# Patient Record
Sex: MALE | Race: WHITE | Employment: FULL TIME | URBAN - METROPOLITAN AREA
[De-identification: names, ages, dates, MRNs, and addresses within clinical notes are randomized per-mention and may not be internally consistent; named-entity substitution may affect disease eponyms.]

---

## 2017-03-04 ENCOUNTER — PATIENT MESSAGE (OUTPATIENT)
Dept: FAMILY MEDICINE CLINIC | Facility: CLINIC | Age: 52
End: 2017-03-04

## 2017-03-06 NOTE — TELEPHONE ENCOUNTER
From: Harper France  To: Vaishnavi Eldridge MD  Sent: 3/4/2017 12:58 AM CST  Subject: Prescription Question    Hi Dr. Herman Ojeda know that I have been sufferring from leg swelling now for years now, and I have been trying to think what it coincided with,

## 2017-05-12 ENCOUNTER — OFFICE VISIT (OUTPATIENT)
Dept: FAMILY MEDICINE CLINIC | Facility: CLINIC | Age: 52
End: 2017-05-12

## 2017-05-12 VITALS
HEART RATE: 76 BPM | WEIGHT: 292 LBS | BODY MASS INDEX: 40.43 KG/M2 | HEIGHT: 71.4 IN | DIASTOLIC BLOOD PRESSURE: 82 MMHG | RESPIRATION RATE: 18 BRPM | SYSTOLIC BLOOD PRESSURE: 122 MMHG | TEMPERATURE: 98 F

## 2017-05-12 DIAGNOSIS — E78.5 HYPERLIPIDEMIA, UNSPECIFIED HYPERLIPIDEMIA TYPE: Primary | ICD-10-CM

## 2017-05-12 DIAGNOSIS — I87.2 VENOUS INSUFFICIENCY OF BOTH LOWER EXTREMITIES: ICD-10-CM

## 2017-05-12 DIAGNOSIS — M54.32 SCIATIC NERVE PAIN, LEFT: ICD-10-CM

## 2017-05-12 DIAGNOSIS — E66.9 NON MORBID OBESITY, UNSPECIFIED OBESITY TYPE: ICD-10-CM

## 2017-05-12 PROCEDURE — 99212 OFFICE O/P EST SF 10 MIN: CPT | Performed by: FAMILY MEDICINE

## 2017-05-12 RX ORDER — GLUCOSA SU 2KCL/CHONDROITIN SU 500-400 MG
CAPSULE ORAL
Qty: 100 CAPSULE | Refills: 0 | Status: SHIPPED | OUTPATIENT
Start: 2017-05-12 | End: 2017-06-11

## 2017-05-12 RX ORDER — SIMVASTATIN 40 MG
TABLET ORAL
Qty: 90 TABLET | Refills: 1 | Status: SHIPPED | OUTPATIENT
Start: 2017-05-12 | End: 2017-12-18

## 2017-05-12 NOTE — PROGRESS NOTES
HPI:    Patient ID: Jahaira Meehan is a 45year old male.     HPI    Review of Systems         Current Outpatient Prescriptions:  simvastatin 40 MG Oral Tab TAKE ONE TABLET BY MOUTH NIGHTLY Disp: 90 tablet Rfl: 1   Coenzyme Q10 (CO Q10) 100 MG Oral Cap One strengthening exercises. No orders of the defined types were placed in this encounter.        Meds This Visit:  Signed Prescriptions Disp Refills    simvastatin 40 MG Oral Tab 90 tablet 1      Sig: TAKE ONE TABLET BY MOUTH NIGHTLY      Coenzyme Q10 (CO Q

## 2017-07-14 ENCOUNTER — PATIENT MESSAGE (OUTPATIENT)
Dept: FAMILY MEDICINE CLINIC | Facility: CLINIC | Age: 52
End: 2017-07-14

## 2017-07-17 NOTE — TELEPHONE ENCOUNTER
From: Winnie Dumont  To: Alexa Mroeira MD  Sent: 7/14/2017 10:25 AM CDT  Subject: Other    Dr Rajiv Parsons    My company is giving me a weekly stipend to pay for medical insurance. Can you recommend a good plan that is affordable and that your office accepts?

## 2017-07-17 NOTE — TELEPHONE ENCOUNTER
Jorje Cover can you please help patient find out which plans we take, and any additional information you can provide.

## 2017-08-01 NOTE — TELEPHONE ENCOUNTER
Please see previous my chart encounter and be sure Jose L Sterling is aware of patient's repeated inquiry.

## 2017-08-09 NOTE — TELEPHONE ENCOUNTER
Rima---please see My Chart Message, would you recommend that I provide patient with our Financial Counselor's contact information?

## 2017-11-13 ENCOUNTER — APPOINTMENT (OUTPATIENT)
Dept: LAB | Age: 52
End: 2017-11-13
Attending: FAMILY MEDICINE
Payer: COMMERCIAL

## 2017-11-13 ENCOUNTER — OFFICE VISIT (OUTPATIENT)
Dept: FAMILY MEDICINE CLINIC | Facility: CLINIC | Age: 52
End: 2017-11-13

## 2017-11-13 VITALS
HEART RATE: 76 BPM | SYSTOLIC BLOOD PRESSURE: 130 MMHG | DIASTOLIC BLOOD PRESSURE: 84 MMHG | RESPIRATION RATE: 17 BRPM | HEIGHT: 71.4 IN | BODY MASS INDEX: 42 KG/M2 | TEMPERATURE: 98 F | WEIGHT: 303.38 LBS

## 2017-11-13 DIAGNOSIS — R07.9 CHEST PAIN, UNSPECIFIED TYPE: ICD-10-CM

## 2017-11-13 DIAGNOSIS — E78.5 HYPERLIPIDEMIA, UNSPECIFIED HYPERLIPIDEMIA TYPE: ICD-10-CM

## 2017-11-13 DIAGNOSIS — R06.83 SNORING: ICD-10-CM

## 2017-11-13 DIAGNOSIS — I87.2 VENOUS INSUFFICIENCY OF BOTH LOWER EXTREMITIES: ICD-10-CM

## 2017-11-13 DIAGNOSIS — E66.01 MORBID OBESITY (HCC): ICD-10-CM

## 2017-11-13 DIAGNOSIS — Z72.89 OTHER PROBLEMS RELATED TO LIFESTYLE: ICD-10-CM

## 2017-11-13 DIAGNOSIS — E78.5 HYPERLIPIDEMIA, UNSPECIFIED HYPERLIPIDEMIA TYPE: Primary | ICD-10-CM

## 2017-11-13 PROCEDURE — 99214 OFFICE O/P EST MOD 30 MIN: CPT | Performed by: FAMILY MEDICINE

## 2017-11-13 PROCEDURE — 99212 OFFICE O/P EST SF 10 MIN: CPT | Performed by: FAMILY MEDICINE

## 2017-11-13 PROCEDURE — 36415 COLL VENOUS BLD VENIPUNCTURE: CPT

## 2017-11-13 PROCEDURE — 84443 ASSAY THYROID STIM HORMONE: CPT

## 2017-11-13 PROCEDURE — 80053 COMPREHEN METABOLIC PANEL: CPT

## 2017-11-13 PROCEDURE — 86803 HEPATITIS C AB TEST: CPT

## 2017-11-13 PROCEDURE — 80061 LIPID PANEL: CPT

## 2017-11-13 PROCEDURE — 90686 IIV4 VACC NO PRSV 0.5 ML IM: CPT | Performed by: FAMILY MEDICINE

## 2017-11-13 PROCEDURE — 90471 IMMUNIZATION ADMIN: CPT | Performed by: FAMILY MEDICINE

## 2017-11-13 NOTE — PROGRESS NOTES
HPI:    Patient ID: Pavithra Strong is a 45year old male. Hyperlipidemia   This is a chronic problem. The current episode started more than 1 year ago. The problem is controlled. Exacerbating diseases include obesity. He has no history of diabetes.  Ther obesity, occasional daytime somnolence. Recommend sleep study    Venous insufficiency of both lower extremities– encourage continued low-sodium diet, compression hose, avoid standing and sitting for long periods.   Referred to vascular surgery    Morbid ob

## 2017-11-20 ENCOUNTER — HOSPITAL ENCOUNTER (OUTPATIENT)
Dept: NUCLEAR MEDICINE | Facility: HOSPITAL | Age: 52
Discharge: HOME | End: 2017-11-20
Attending: FAMILY MEDICINE
Payer: COMMERCIAL

## 2017-11-20 ENCOUNTER — HOSPITAL ENCOUNTER (OUTPATIENT)
Dept: CV DIAGNOSTICS | Facility: HOSPITAL | Age: 52
Discharge: HOME | End: 2017-11-20
Attending: FAMILY MEDICINE
Payer: COMMERCIAL

## 2017-11-20 DIAGNOSIS — R07.9 CHEST PAIN, UNSPECIFIED TYPE: ICD-10-CM

## 2017-11-20 PROCEDURE — 78452 HT MUSCLE IMAGE SPECT MULT: CPT | Performed by: FAMILY MEDICINE

## 2017-11-20 PROCEDURE — 93017 CV STRESS TEST TRACING ONLY: CPT | Performed by: FAMILY MEDICINE

## 2017-11-20 PROCEDURE — 93016 CV STRESS TEST SUPVJ ONLY: CPT | Performed by: FAMILY MEDICINE

## 2017-11-20 PROCEDURE — 93018 CV STRESS TEST I&R ONLY: CPT | Performed by: FAMILY MEDICINE

## 2017-11-20 RX ORDER — SODIUM CHLORIDE 9 MG/ML
INJECTION, SOLUTION INTRAVENOUS
Status: COMPLETED
Start: 2017-11-20 | End: 2017-11-20

## 2017-11-20 RX ADMIN — SODIUM CHLORIDE 50 ML: 9 INJECTION, SOLUTION INTRAVENOUS at 13:30:00

## 2017-11-28 ENCOUNTER — PATIENT MESSAGE (OUTPATIENT)
Dept: FAMILY MEDICINE CLINIC | Facility: CLINIC | Age: 52
End: 2017-11-28

## 2017-11-28 DIAGNOSIS — E66.01 MORBID OBESITY (HCC): Primary | ICD-10-CM

## 2017-11-28 NOTE — TELEPHONE ENCOUNTER
From: Lesli Mcgregor  To: Tran Pulido MD  Sent: 11/28/2017 9:19 AM CST  Subject: Test Results Question    So looks like my heart is good?

## 2017-12-01 ENCOUNTER — TELEPHONE (OUTPATIENT)
Dept: SURGERY | Facility: CLINIC | Age: 52
End: 2017-12-01

## 2017-12-01 NOTE — TELEPHONE ENCOUNTER
Spoke to patient regarding incoming referral for bariatric surgery consult. Pt states he will be switching insurance plans to [facility]  starting . ID in epic is also .  Pt plans to send his updated ID and insurance information to jose

## 2017-12-21 PROBLEM — R60.0 BILATERAL LEG EDEMA: Status: ACTIVE | Noted: 2017-12-21

## 2017-12-22 RX ORDER — SIMVASTATIN 40 MG
TABLET ORAL
Qty: 90 TABLET | Refills: 0 | Status: SHIPPED | OUTPATIENT
Start: 2017-12-22 | End: 2018-05-22

## 2017-12-22 NOTE — TELEPHONE ENCOUNTER
Signed Prescriptions Disp Refills    SIMVASTATIN 40 MG Oral Tab 90 tablet 0      Sig: TAKE ONE TABLET BY MOUTH NIGHTLY        Authorizing Provider: Srinivas Bellamy        Ordering User: Jase Jimenez           Refill approved per protocol.           Chol

## 2018-01-11 ENCOUNTER — TELEPHONE (OUTPATIENT)
Dept: SURGERY | Facility: CLINIC | Age: 53
End: 2018-01-11

## 2018-01-22 ENCOUNTER — OFFICE VISIT (OUTPATIENT)
Dept: FAMILY MEDICINE CLINIC | Facility: CLINIC | Age: 53
End: 2018-01-22

## 2018-01-22 VITALS
TEMPERATURE: 98 F | SYSTOLIC BLOOD PRESSURE: 121 MMHG | WEIGHT: 303 LBS | HEIGHT: 72 IN | BODY MASS INDEX: 41.04 KG/M2 | RESPIRATION RATE: 17 BRPM | HEART RATE: 75 BPM | DIASTOLIC BLOOD PRESSURE: 79 MMHG

## 2018-01-22 DIAGNOSIS — I89.0 LYMPHEDEMA OF LOWER EXTREMITY, UNSPECIFIED LATERALITY: ICD-10-CM

## 2018-01-22 DIAGNOSIS — E66.01 MORBID OBESITY (HCC): Primary | ICD-10-CM

## 2018-01-22 DIAGNOSIS — E78.5 HYPERLIPIDEMIA, UNSPECIFIED HYPERLIPIDEMIA TYPE: ICD-10-CM

## 2018-01-22 PROCEDURE — 99212 OFFICE O/P EST SF 10 MIN: CPT | Performed by: FAMILY MEDICINE

## 2018-01-22 PROCEDURE — 99213 OFFICE O/P EST LOW 20 MIN: CPT | Performed by: FAMILY MEDICINE

## 2018-01-22 NOTE — PROGRESS NOTES
HPI:    Patient ID: Bret Handley is a 52year old male.     See below        Review of Systems         Current Outpatient Prescriptions:  SIMVASTATIN 40 MG Oral Tab TAKE ONE TABLET BY MOUTH NIGHTLY Disp: 90 tablet Rfl: 0   omeprazole (PRILOSEC) 20 MG Oral of weight loss. No orders of the defined types were placed in this encounter.       Meds This Visit:  No prescriptions requested or ordered in this encounter    Imaging & Referrals:  Nani Castro, DIET (INTERNAL)       YS#[id number]

## 2018-02-09 ENCOUNTER — TELEPHONE (OUTPATIENT)
Dept: SURGERY | Facility: CLINIC | Age: 53
End: 2018-02-09

## 2018-02-09 NOTE — TELEPHONE ENCOUNTER
Patient was referred to Bariatric Dept to see Dr. Fadia Peralta by Dr. Mellisa Parker. I called insurance on 1/25/18 @ 11:00am and verified with Elvia Sumner. That patients current insurance does not have any coverage for Bariatric surgery.      I called patient today and informed

## 2018-05-22 RX ORDER — SIMVASTATIN 40 MG
40 TABLET ORAL NIGHTLY
Qty: 90 TABLET | Refills: 0 | Status: SHIPPED | OUTPATIENT
Start: 2018-05-22 | End: 2018-08-15

## 2018-05-22 NOTE — TELEPHONE ENCOUNTER
Refilled per protocol      Cholesterol Medications  Protocol Criteria:  · Appointment scheduled in the past 12 months or in the next 3 months  · ALT & LDL on file in the past 12 months  · ALT result < 80  · LDL result <130   Recent Outpatient Visits

## 2018-05-22 NOTE — TELEPHONE ENCOUNTER
From: Jeff Dan  Sent: 5/22/2018 9:54 AM CDT  Subject: Medication Renewal Request    Travon Overton would like a refill of the following medications:     SIMVASTATIN 40 MG Oral Tab [Lima Crawford MD]   Patient Comment: Have not received a renewal o

## 2018-05-23 RX ORDER — SIMVASTATIN 40 MG
TABLET ORAL
Qty: 90 TABLET | Refills: 3 | Status: SHIPPED | OUTPATIENT
Start: 2018-05-23 | End: 2019-06-26

## 2018-08-15 ENCOUNTER — OFFICE VISIT (OUTPATIENT)
Dept: FAMILY MEDICINE CLINIC | Facility: CLINIC | Age: 53
End: 2018-08-15
Payer: COMMERCIAL

## 2018-08-15 VITALS
BODY MASS INDEX: 38.47 KG/M2 | DIASTOLIC BLOOD PRESSURE: 73 MMHG | WEIGHT: 284 LBS | HEART RATE: 80 BPM | SYSTOLIC BLOOD PRESSURE: 107 MMHG | HEIGHT: 72 IN

## 2018-08-15 DIAGNOSIS — M17.0 PRIMARY OSTEOARTHRITIS OF BOTH KNEES: Primary | ICD-10-CM

## 2018-08-15 DIAGNOSIS — E66.9 NON MORBID OBESITY, UNSPECIFIED OBESITY TYPE: ICD-10-CM

## 2018-08-15 PROCEDURE — 99212 OFFICE O/P EST SF 10 MIN: CPT | Performed by: FAMILY MEDICINE

## 2018-08-15 PROCEDURE — 99213 OFFICE O/P EST LOW 20 MIN: CPT | Performed by: FAMILY MEDICINE

## 2018-08-15 RX ORDER — CELECOXIB 200 MG/1
200 CAPSULE ORAL DAILY
Qty: 90 CAPSULE | Refills: 3 | Status: SHIPPED | OUTPATIENT
Start: 2018-08-15 | End: 2019-08-01

## 2018-08-16 NOTE — PROGRESS NOTES
HPI:    Patient ID: Lesli Mcgregor is a 52year old male. Knee Pain    The pain is present in the right knee and left knee. This is a chronic problem. The current episode started more than 1 year ago. The problem has been gradually worsening.  The qualit seen by a physician. Unsure of active ingredients. Initially there was some improvement in knee pain but subsequent worsening. Discussed options for treatment.   Plan continued weight loss, exercise with cycling, and start Celebrex as directed reviewed i

## 2018-09-18 ENCOUNTER — OFFICE VISIT (OUTPATIENT)
Dept: FAMILY MEDICINE CLINIC | Facility: CLINIC | Age: 53
End: 2018-09-18
Payer: COMMERCIAL

## 2018-09-18 VITALS
RESPIRATION RATE: 18 BRPM | DIASTOLIC BLOOD PRESSURE: 82 MMHG | HEIGHT: 72 IN | SYSTOLIC BLOOD PRESSURE: 128 MMHG | WEIGHT: 300.81 LBS | HEART RATE: 73 BPM | TEMPERATURE: 98 F | BODY MASS INDEX: 40.74 KG/M2

## 2018-09-18 DIAGNOSIS — L91.8 MULTIPLE ACQUIRED SKIN TAGS: Primary | ICD-10-CM

## 2018-09-18 PROCEDURE — 11200 RMVL SKIN TAGS UP TO&INC 15: CPT | Performed by: FAMILY MEDICINE

## 2018-09-19 NOTE — PROGRESS NOTES
HPI:    Patient ID: Genie Gosselin is a 52year old male. Skin   This is a chronic problem. The current episode started more than 1 year ago. The problem has been gradually worsening.        Review of Systems         Current Outpatient Medications:  johnny

## 2019-02-05 ENCOUNTER — OFFICE VISIT (OUTPATIENT)
Dept: FAMILY MEDICINE CLINIC | Facility: CLINIC | Age: 54
End: 2019-02-05

## 2019-02-05 VITALS
WEIGHT: 309 LBS | HEART RATE: 76 BPM | BODY MASS INDEX: 42 KG/M2 | SYSTOLIC BLOOD PRESSURE: 132 MMHG | RESPIRATION RATE: 18 BRPM | TEMPERATURE: 98 F | DIASTOLIC BLOOD PRESSURE: 78 MMHG

## 2019-02-05 DIAGNOSIS — I87.2 VENOUS INSUFFICIENCY OF BOTH LOWER EXTREMITIES: ICD-10-CM

## 2019-02-05 DIAGNOSIS — E66.01 MORBID OBESITY (HCC): ICD-10-CM

## 2019-02-05 DIAGNOSIS — E78.5 HYPERLIPIDEMIA, UNSPECIFIED HYPERLIPIDEMIA TYPE: ICD-10-CM

## 2019-02-05 DIAGNOSIS — M17.0 PRIMARY OSTEOARTHRITIS OF BOTH KNEES: Primary | ICD-10-CM

## 2019-02-05 PROCEDURE — 90471 IMMUNIZATION ADMIN: CPT | Performed by: FAMILY MEDICINE

## 2019-02-05 PROCEDURE — 90686 IIV4 VACC NO PRSV 0.5 ML IM: CPT | Performed by: FAMILY MEDICINE

## 2019-02-05 PROCEDURE — 99212 OFFICE O/P EST SF 10 MIN: CPT | Performed by: FAMILY MEDICINE

## 2019-02-05 NOTE — PROGRESS NOTES
HPI:    Patient ID: Mariusz Hendricks is a 62year old male. Knee Pain    The pain is present in the left knee and right knee. This is a chronic problem. The current episode started more than 1 year ago. There has been no history of extremity trauma.  The p somewhat effective. Strongly recommend weight loss. Morbid obesity– patient has tried Tad Pickerel and multiple other weight loss methods in the past.  No sustained success. Strongly encourage consideration of bariatric surgery. Referrals given.     Hy

## 2019-02-05 NOTE — PATIENT INSTRUCTIONS
If self pay look into  Magen Prim. Gallo Mckeon M.D. [id number] [facility] Gallo Mckeon M.D.  [facility]. [email or web address]   [address], [address]   Office Phone: [phone]

## 2019-02-06 LAB
ALBUMIN/GLOBULIN RATIO: 1.8 (CALC) (ref 1–2.5)
ALBUMIN: 4.3 G/DL (ref 3.6–5.1)
ALKALINE PHOSPHATASE: 81 U/L (ref 40–115)
ALT: 36 U/L (ref 9–46)
AST: 33 U/L (ref 10–35)
BILIRUBIN, TOTAL: 0.7 MG/DL (ref 0.2–1.2)
BUN: 13 MG/DL (ref 7–25)
CALCIUM: 9.6 MG/DL (ref 8.6–10.3)
CARBON DIOXIDE: 29 MMOL/L (ref 20–32)
CHLORIDE: 106 MMOL/L (ref 98–110)
CHOL/HDLC RATIO: 3.1 (CALC)
CHOLESTEROL, TOTAL: 172 MG/DL
CREATININE: 0.83 MG/DL (ref 0.7–1.33)
EGFR IF AFRICN AM: 114 ML/MIN/1.73M2
EGFR IF NONAFRICN AM: 98 ML/MIN/1.73M2
GLOBULIN: 2.4 G/DL (CALC) (ref 1.9–3.7)
GLUCOSE: 100 MG/DL (ref 65–99)
HDL CHOLESTEROL: 55 MG/DL
LDL-CHOLESTEROL: 97 MG/DL (CALC)
NON-HDL CHOLESTEROL: 117 MG/DL (CALC)
POTASSIUM: 4.6 MMOL/L (ref 3.5–5.3)
PROTEIN, TOTAL: 6.7 G/DL (ref 6.1–8.1)
SODIUM: 142 MMOL/L (ref 135–146)
TRIGLYCERIDES: 107 MG/DL

## 2019-02-21 ENCOUNTER — PATIENT MESSAGE (OUTPATIENT)
Dept: FAMILY MEDICINE CLINIC | Facility: CLINIC | Age: 54
End: 2019-02-21

## 2019-02-22 NOTE — TELEPHONE ENCOUNTER
From: Lynne Rizo  To: Ulysses Crowell MD  Sent: 2/21/2019 4:27 PM CST  Subject: Test Results Question    Doc    What about the results from the sleep study I took back in late 2017? Sould they suffice? Those visits were $250 each out of pocket.     Just

## 2019-02-22 NOTE — TELEPHONE ENCOUNTER
JAYCEE Bashir please see pt GBooking message below. I have send pt the number where they can call to find out about the cost of a x ray or labs.

## 2019-05-05 ENCOUNTER — HOSPITAL (OUTPATIENT)
Dept: OTHER | Age: 54
End: 2019-05-05
Attending: EMERGENCY MEDICINE

## 2019-05-06 ENCOUNTER — TELEPHONE (OUTPATIENT)
Dept: FAMILY MEDICINE CLINIC | Facility: CLINIC | Age: 54
End: 2019-05-06

## 2019-05-06 NOTE — TELEPHONE ENCOUNTER
Pt called in requesting to come in for staple removal from his head. Pt stated that he was advised to come in 5 days after staple placement which was over the weekend. Pt is requesting to come in on 5/1. MH is it ok to book pt in a res 24 slot?      Please reply to pool: SCARLET Acuna

## 2019-05-06 NOTE — TELEPHONE ENCOUNTER
I suspect you mean 5/10? Anyway, whenever it is, okay to use acute visit. But please let him know it may be just a wound check, staples usually do not come out in hand until 10 days after wound.

## 2019-05-13 ENCOUNTER — OFFICE VISIT (OUTPATIENT)
Dept: FAMILY MEDICINE CLINIC | Facility: CLINIC | Age: 54
End: 2019-05-13
Payer: COMMERCIAL

## 2019-05-13 VITALS
RESPIRATION RATE: 20 BRPM | TEMPERATURE: 98 F | HEIGHT: 72 IN | HEART RATE: 88 BPM | DIASTOLIC BLOOD PRESSURE: 83 MMHG | WEIGHT: 300 LBS | SYSTOLIC BLOOD PRESSURE: 148 MMHG | BODY MASS INDEX: 40.63 KG/M2

## 2019-05-13 DIAGNOSIS — S80.11XD CONTUSION OF RIGHT KNEE AND LOWER LEG, SUBSEQUENT ENCOUNTER: ICD-10-CM

## 2019-05-13 DIAGNOSIS — Z48.02 VISIT FOR SUTURE REMOVAL: Primary | ICD-10-CM

## 2019-05-13 DIAGNOSIS — S80.01XD CONTUSION OF RIGHT KNEE AND LOWER LEG, SUBSEQUENT ENCOUNTER: ICD-10-CM

## 2019-05-13 DIAGNOSIS — M77.11 LATERAL EPICONDYLITIS OF RIGHT ELBOW: ICD-10-CM

## 2019-05-13 PROCEDURE — 99212 OFFICE O/P EST SF 10 MIN: CPT | Performed by: FAMILY MEDICINE

## 2019-05-13 PROCEDURE — 99213 OFFICE O/P EST LOW 20 MIN: CPT | Performed by: FAMILY MEDICINE

## 2019-05-13 NOTE — PROGRESS NOTES
HPI:    Patient ID: Eriberto Perez is a 62year old male. Suture Removal   The sutures were placed 7 to 10 days ago. He tried antibiotic ointment use since the wound repair. The treatment provided significant relief.  There has been no drainage from the placed in this encounter.       Meds This Visit:  Requested Prescriptions      No prescriptions requested or ordered in this encounter       Imaging & Referrals:  None       #[id number]

## 2019-06-26 RX ORDER — SIMVASTATIN 40 MG
TABLET ORAL
Qty: 90 TABLET | Refills: 2 | Status: SHIPPED | OUTPATIENT
Start: 2019-06-26 | End: 2020-06-03

## 2019-06-26 NOTE — TELEPHONE ENCOUNTER
Refill passed per [facility] protocol.   Cholesterol Medications  Protocol Criteria:  · Appointment scheduled in the past 12 months or in the next 3 months  · ALT & LDL on file in the past 12 months  · ALT result < 80  · LDL result <130   Recent Outpat

## 2019-07-30 ENCOUNTER — PATIENT MESSAGE (OUTPATIENT)
Dept: FAMILY MEDICINE CLINIC | Facility: CLINIC | Age: 54
End: 2019-07-30

## 2019-07-31 NOTE — TELEPHONE ENCOUNTER
From: Humberto Lennon  To: Don Figueredo MD  Sent: 7/30/2019 11:47 AM CDT  Subject: Prescription Question    Hi Dr. Tanika Moore    A friend of mine sent this article to me about the dangers of Statins. Can you take a look and let me know your thoughts?  Are any

## 2019-08-01 RX ORDER — CELECOXIB 200 MG/1
CAPSULE ORAL
Qty: 90 CAPSULE | Refills: 3 | Status: SHIPPED | OUTPATIENT
Start: 2019-08-01 | End: 2021-01-06

## 2019-09-12 ENCOUNTER — OFFICE VISIT (OUTPATIENT)
Dept: FAMILY MEDICINE CLINIC | Facility: CLINIC | Age: 54
End: 2019-09-12
Payer: COMMERCIAL

## 2019-09-12 ENCOUNTER — APPOINTMENT (OUTPATIENT)
Dept: LAB | Facility: HOSPITAL | Age: 54
End: 2019-09-12
Attending: FAMILY MEDICINE
Payer: COMMERCIAL

## 2019-09-12 VITALS
HEART RATE: 80 BPM | WEIGHT: 301.38 LBS | RESPIRATION RATE: 18 BRPM | BODY MASS INDEX: 41 KG/M2 | DIASTOLIC BLOOD PRESSURE: 80 MMHG | TEMPERATURE: 98 F | SYSTOLIC BLOOD PRESSURE: 140 MMHG

## 2019-09-12 DIAGNOSIS — E78.2 MIXED HYPERLIPIDEMIA: ICD-10-CM

## 2019-09-12 DIAGNOSIS — Z12.5 SCREENING FOR PROSTATE CANCER: ICD-10-CM

## 2019-09-12 DIAGNOSIS — R74.8 ELEVATED LIVER ENZYMES: Primary | ICD-10-CM

## 2019-09-12 DIAGNOSIS — E66.01 MORBID OBESITY (HCC): ICD-10-CM

## 2019-09-12 PROBLEM — Z80.42 FAMILY HISTORY OF PROSTATE CANCER: Status: ACTIVE | Noted: 2019-09-12

## 2019-09-12 PROCEDURE — 36415 COLL VENOUS BLD VENIPUNCTURE: CPT

## 2019-09-12 PROCEDURE — 99214 OFFICE O/P EST MOD 30 MIN: CPT | Performed by: FAMILY MEDICINE

## 2019-09-12 NOTE — PROGRESS NOTES
HPI:    Patient ID: Prabhu Early is a 62year old male. Cirrhosis   This is a chronic problem. The current episode started more than 1 year ago. The problem has been unchanged. Associated symptoms include a rash.  Pertinent negatives include no abdomin evaluation. Screening for prostate cancer–brother recently diagnosed with prostate cancer. Recommend annual PSA testing. Morbid obesity (hcc)– diet and weight loss recommendations discussed    Mixed hyperlipidemia–tolerating simvastatin well.   José Matthews

## 2019-09-13 LAB
% SATURATION: 20 % (CALC) (ref 20–48)
IRON BINDING CAPACITY: 312 MCG/DL (CALC) (ref 250–425)
IRON, TOTAL: 63 MCG/DL (ref 50–180)
PSA, TOTAL: 1.8 NG/ML

## 2020-04-16 ENCOUNTER — VIRTUAL PHONE E/M (OUTPATIENT)
Dept: FAMILY MEDICINE CLINIC | Facility: CLINIC | Age: 55
End: 2020-04-16
Payer: COMMERCIAL

## 2020-04-16 DIAGNOSIS — R74.8 ELEVATED LIVER ENZYMES: Primary | ICD-10-CM

## 2020-04-16 DIAGNOSIS — M17.0 PRIMARY OSTEOARTHRITIS OF BOTH KNEES: ICD-10-CM

## 2020-04-16 DIAGNOSIS — E66.01 MORBID OBESITY (HCC): ICD-10-CM

## 2020-04-16 DIAGNOSIS — E78.2 MIXED HYPERLIPIDEMIA: ICD-10-CM

## 2020-04-16 PROCEDURE — 99212 OFFICE O/P EST SF 10 MIN: CPT | Performed by: FAMILY MEDICINE

## 2020-04-16 NOTE — PROGRESS NOTES
Virtual Telephone Check-In    Mirian Joshua verbally consents to a Virtual/Telephone Check-In visit on 04/16/20. Patient understands and accepts financial responsibility for any deductible, co-insurance and/or co-pays associated with this service.     D

## 2020-06-03 RX ORDER — SIMVASTATIN 40 MG
TABLET ORAL
Qty: 90 TABLET | Refills: 3 | Status: SHIPPED | OUTPATIENT
Start: 2020-06-03 | End: 2021-07-28

## 2020-06-17 ENCOUNTER — APPOINTMENT (OUTPATIENT)
Dept: LAB | Age: 55
End: 2020-06-17
Attending: INTERNAL MEDICINE
Payer: COMMERCIAL

## 2020-06-17 DIAGNOSIS — R79.89 ELEVATED LIVER FUNCTION TESTS: ICD-10-CM

## 2020-06-17 PROCEDURE — 86256 FLUORESCENT ANTIBODY TITER: CPT | Performed by: INTERNAL MEDICINE

## 2020-06-17 PROCEDURE — 87340 HEPATITIS B SURFACE AG IA: CPT | Performed by: INTERNAL MEDICINE

## 2020-06-17 PROCEDURE — 82550 ASSAY OF CK (CPK): CPT | Performed by: INTERNAL MEDICINE

## 2020-06-17 PROCEDURE — 86708 HEPATITIS A ANTIBODY: CPT | Performed by: INTERNAL MEDICINE

## 2020-06-17 PROCEDURE — 86706 HEP B SURFACE ANTIBODY: CPT | Performed by: INTERNAL MEDICINE

## 2020-06-17 PROCEDURE — 80076 HEPATIC FUNCTION PANEL: CPT | Performed by: INTERNAL MEDICINE

## 2020-06-17 PROCEDURE — 82390 ASSAY OF CERULOPLASMIN: CPT | Performed by: INTERNAL MEDICINE

## 2020-06-17 PROCEDURE — 85025 COMPLETE CBC W/AUTO DIFF WBC: CPT | Performed by: INTERNAL MEDICINE

## 2020-06-17 PROCEDURE — 86038 ANTINUCLEAR ANTIBODIES: CPT | Performed by: INTERNAL MEDICINE

## 2020-06-17 PROCEDURE — 82104 ALPHA-1-ANTITRYPSIN PHENO: CPT

## 2020-06-17 PROCEDURE — 36415 COLL VENOUS BLD VENIPUNCTURE: CPT | Performed by: INTERNAL MEDICINE

## 2020-06-17 PROCEDURE — 82103 ALPHA-1-ANTITRYPSIN TOTAL: CPT

## 2020-06-25 ENCOUNTER — HOSPITAL ENCOUNTER (OUTPATIENT)
Dept: ULTRASOUND IMAGING | Facility: HOSPITAL | Age: 55
Discharge: HOME | End: 2020-06-25
Attending: INTERNAL MEDICINE
Payer: COMMERCIAL

## 2020-06-25 DIAGNOSIS — K76.0 NAFLD (NONALCOHOLIC FATTY LIVER DISEASE): ICD-10-CM

## 2020-06-25 PROCEDURE — 76705 ECHO EXAM OF ABDOMEN: CPT | Performed by: INTERNAL MEDICINE

## 2020-06-25 PROCEDURE — 76981 USE PARENCHYMA: CPT | Performed by: INTERNAL MEDICINE

## 2020-06-26 ENCOUNTER — OFFICE VISIT (OUTPATIENT)
Dept: FAMILY MEDICINE CLINIC | Facility: CLINIC | Age: 55
End: 2020-06-26
Payer: COMMERCIAL

## 2020-06-26 VITALS
HEART RATE: 93 BPM | WEIGHT: 311 LBS | BODY MASS INDEX: 42 KG/M2 | TEMPERATURE: 98 F | RESPIRATION RATE: 18 BRPM | DIASTOLIC BLOOD PRESSURE: 80 MMHG | SYSTOLIC BLOOD PRESSURE: 134 MMHG

## 2020-06-26 DIAGNOSIS — R74.8 ELEVATED LIVER ENZYMES: Primary | ICD-10-CM

## 2020-06-26 DIAGNOSIS — E66.01 MORBID OBESITY (HCC): ICD-10-CM

## 2020-06-26 DIAGNOSIS — K76.0 HEPATIC STEATOSIS: ICD-10-CM

## 2020-06-26 DIAGNOSIS — M17.0 PRIMARY OSTEOARTHRITIS OF BOTH KNEES: ICD-10-CM

## 2020-06-26 PROCEDURE — 90632 HEPA VACCINE ADULT IM: CPT | Performed by: FAMILY MEDICINE

## 2020-06-26 PROCEDURE — 90471 IMMUNIZATION ADMIN: CPT | Performed by: FAMILY MEDICINE

## 2020-06-26 PROCEDURE — 99213 OFFICE O/P EST LOW 20 MIN: CPT | Performed by: FAMILY MEDICINE

## 2020-06-26 NOTE — PROGRESS NOTES
HPI:    Patient ID: Prabhu Early is a 60year old male. Obesity   This is a chronic problem. The current episode started more than 1 year ago. The problem has been waxing and waning. Associated symptoms include fatigue and joint swelling.  Pertinent ne Dr. Landry Beaulieu.     Morbid obesity (hcc)– patient has had success with weight loss using Ettie Nely in the past.  He does not wish to do this again but encouraged him to consider [place] weight loss program, Irvin or Carito    Hepatic steatosis–as above    Primar

## 2020-08-10 ENCOUNTER — VIRTUAL PHONE E/M (OUTPATIENT)
Dept: SURGERY | Facility: CLINIC | Age: 55
End: 2020-08-10
Payer: COMMERCIAL

## 2020-08-10 DIAGNOSIS — K74.00 LIVER FIBROSIS: Primary | ICD-10-CM

## 2020-08-12 NOTE — PROGRESS NOTES
[facility] at [address], [address] [place] [address]  [address] [phone] ([phone]) symptoms of liver disease.      Past Medical History:   Diagnosis Date   • Back pain    • Bilateral lower extremity edema 2012    perpheral BLE edema   • Esophageal reflux     medication, EGD 2013   • Hemorrhoids 2001    colonoscopy, nl colonoscopy 2013   • 6/25/20 consistent with fatty liver with moderate to severe fibrosis (F3), no lesion.    - LFTs 6/17/20 with increased ALT and AST elevation; continue to monitor  - Discussed the importance of weight loss in preserving liver function; recommend consulting

## 2020-09-12 ENCOUNTER — HOSPITAL ENCOUNTER (OUTPATIENT)
Dept: ULTRASOUND IMAGING | Facility: HOSPITAL | Age: 55
Discharge: HOME | End: 2020-09-12
Attending: INTERNAL MEDICINE
Payer: COMMERCIAL

## 2020-09-12 DIAGNOSIS — K74.00 LIVER FIBROSIS: ICD-10-CM

## 2021-01-06 RX ORDER — CELECOXIB 200 MG/1
CAPSULE ORAL
Qty: 90 CAPSULE | Refills: 3 | Status: SHIPPED | OUTPATIENT
Start: 2021-01-06 | End: 2021-12-27

## 2021-01-13 ENCOUNTER — OFFICE VISIT (OUTPATIENT)
Dept: FAMILY MEDICINE CLINIC | Facility: CLINIC | Age: 56
End: 2021-01-13
Payer: COMMERCIAL

## 2021-01-13 ENCOUNTER — LAB ENCOUNTER (OUTPATIENT)
Dept: LAB | Age: 56
End: 2021-01-13
Attending: FAMILY MEDICINE
Payer: COMMERCIAL

## 2021-01-13 VITALS
SYSTOLIC BLOOD PRESSURE: 111 MMHG | WEIGHT: 313.38 LBS | RESPIRATION RATE: 18 BRPM | TEMPERATURE: 98 F | DIASTOLIC BLOOD PRESSURE: 71 MMHG | HEIGHT: 72 IN | BODY MASS INDEX: 42.45 KG/M2 | HEART RATE: 87 BPM

## 2021-01-13 DIAGNOSIS — K76.0 HEPATIC STEATOSIS: ICD-10-CM

## 2021-01-13 DIAGNOSIS — E66.01 MORBID OBESITY (HCC): ICD-10-CM

## 2021-01-13 DIAGNOSIS — K74.00 LIVER FIBROSIS: ICD-10-CM

## 2021-01-13 DIAGNOSIS — Z12.5 SCREENING FOR MALIGNANT NEOPLASM OF PROSTATE: ICD-10-CM

## 2021-01-13 DIAGNOSIS — R35.1 NOCTURIA: ICD-10-CM

## 2021-01-13 DIAGNOSIS — I87.2 VENOUS INSUFFICIENCY OF BOTH LOWER EXTREMITIES: ICD-10-CM

## 2021-01-13 DIAGNOSIS — M17.0 PRIMARY OSTEOARTHRITIS OF BOTH KNEES: Primary | ICD-10-CM

## 2021-01-13 DIAGNOSIS — E78.2 MIXED HYPERLIPIDEMIA: ICD-10-CM

## 2021-01-13 LAB
AFP-TM SERPL-MCNC: 3.4 NG/ML (ref ?–8)
ALBUMIN SERPL-MCNC: 3.9 G/DL (ref 3.4–5)
ALBUMIN/GLOB SERPL: 1.1 {RATIO} (ref 1–2)
ALP LIVER SERPL-CCNC: 94 U/L
ALT SERPL-CCNC: 96 U/L
ANION GAP SERPL CALC-SCNC: 6 MMOL/L (ref 0–18)
AST SERPL-CCNC: 63 U/L (ref 15–37)
BILIRUB SERPL-MCNC: 0.6 MG/DL (ref 0.1–2)
BUN BLD-MCNC: 17 MG/DL (ref 7–18)
BUN/CREAT SERPL: 18.5 (ref 10–20)
CALCIUM BLD-MCNC: 9.3 MG/DL (ref 8.5–10.1)
CHLORIDE SERPL-SCNC: 105 MMOL/L (ref 98–112)
CHOLEST SMN-MCNC: 175 MG/DL (ref ?–200)
CO2 SERPL-SCNC: 28 MMOL/L (ref 21–32)
COMPLEXED PSA SERPL-MCNC: 1.45 NG/ML (ref ?–4)
CREAT BLD-MCNC: 0.92 MG/DL
DEPRECATED RDW RBC AUTO: 41.2 FL (ref 35.1–46.3)
ERYTHROCYTE [DISTWIDTH] IN BLOOD BY AUTOMATED COUNT: 12.6 % (ref 11–15)
GLOBULIN PLAS-MCNC: 3.6 G/DL (ref 2.8–4.4)
GLUCOSE BLD-MCNC: 102 MG/DL (ref 70–99)
HCT VFR BLD AUTO: 46.7 %
HDLC SERPL-MCNC: 52 MG/DL (ref 40–59)
HGB BLD-MCNC: 15.6 G/DL
INR BLD: 1 (ref 0.9–1.2)
LDLC SERPL CALC-MCNC: 102 MG/DL (ref ?–100)
M PROTEIN MFR SERPL ELPH: 7.5 G/DL (ref 6.4–8.2)
MCH RBC QN AUTO: 30 PG (ref 26–34)
MCHC RBC AUTO-ENTMCNC: 33.4 G/DL (ref 31–37)
MCV RBC AUTO: 89.8 FL
NONHDLC SERPL-MCNC: 123 MG/DL (ref ?–130)
OSMOLALITY SERPL CALC.SUM OF ELEC: 290 MOSM/KG (ref 275–295)
PATIENT FASTING Y/N/NP: YES
PATIENT FASTING Y/N/NP: YES
PLATELET # BLD AUTO: 262 10(3)UL (ref 150–450)
POTASSIUM SERPL-SCNC: 4.3 MMOL/L (ref 3.5–5.1)
PROTHROMBIN TIME: 13 SECONDS (ref 11.8–14.5)
RBC # BLD AUTO: 5.2 X10(6)UL
SODIUM SERPL-SCNC: 139 MMOL/L (ref 136–145)
TRIGL SERPL-MCNC: 104 MG/DL (ref 30–149)
VLDLC SERPL CALC-MCNC: 21 MG/DL (ref 0–30)
WBC # BLD AUTO: 8.9 X10(3) UL (ref 4–11)

## 2021-01-13 PROCEDURE — 82105 ALPHA-FETOPROTEIN SERUM: CPT | Performed by: INTERNAL MEDICINE

## 2021-01-13 PROCEDURE — 3078F DIAST BP <80 MM HG: CPT | Performed by: FAMILY MEDICINE

## 2021-01-13 PROCEDURE — 3074F SYST BP LT 130 MM HG: CPT | Performed by: FAMILY MEDICINE

## 2021-01-13 PROCEDURE — 80061 LIPID PANEL: CPT | Performed by: INTERNAL MEDICINE

## 2021-01-13 PROCEDURE — 99214 OFFICE O/P EST MOD 30 MIN: CPT | Performed by: FAMILY MEDICINE

## 2021-01-13 PROCEDURE — 3008F BODY MASS INDEX DOCD: CPT | Performed by: FAMILY MEDICINE

## 2021-01-13 PROCEDURE — 85027 COMPLETE CBC AUTOMATED: CPT

## 2021-01-13 PROCEDURE — 85610 PROTHROMBIN TIME: CPT | Performed by: INTERNAL MEDICINE

## 2021-01-13 PROCEDURE — 80053 COMPREHEN METABOLIC PANEL: CPT | Performed by: INTERNAL MEDICINE

## 2021-01-13 PROCEDURE — 36415 COLL VENOUS BLD VENIPUNCTURE: CPT | Performed by: INTERNAL MEDICINE

## 2021-01-13 RX ORDER — TAMSULOSIN HYDROCHLORIDE 0.4 MG/1
0.4 CAPSULE ORAL DAILY
Qty: 90 CAPSULE | Refills: 0 | Status: SHIPPED | OUTPATIENT
Start: 2021-01-13 | End: 2021-02-12

## 2021-01-13 NOTE — PROGRESS NOTES
HPI:    Patient ID: Ju Bradford is a 60year old male. Knee Pain   This is a chronic problem. The current episode started more than 1 year ago. There has been no history of extremity trauma. The problem occurs daily.  The problem has been waxing and w effective. Recommend orthopedics evaluation, weight loss. Venous insufficiency of both lower extremities–continue support hose, weight loss    Hepatic steatosis–followed by Dr. Symone Rothman. Has upcoming follow-up appointment. Check labs today, fasting.

## 2021-01-20 ENCOUNTER — OFFICE VISIT (OUTPATIENT)
Dept: ORTHOPEDICS CLINIC | Facility: CLINIC | Age: 56
End: 2021-01-20
Payer: COMMERCIAL

## 2021-01-20 ENCOUNTER — HOSPITAL ENCOUNTER (OUTPATIENT)
Dept: GENERAL RADIOLOGY | Facility: HOSPITAL | Age: 56
Discharge: HOME | End: 2021-01-20
Attending: ORTHOPAEDIC SURGERY
Payer: COMMERCIAL

## 2021-01-20 VITALS — RESPIRATION RATE: 20 BRPM | DIASTOLIC BLOOD PRESSURE: 71 MMHG | HEART RATE: 85 BPM | SYSTOLIC BLOOD PRESSURE: 121 MMHG

## 2021-01-20 DIAGNOSIS — M79.89 LEG SWELLING: ICD-10-CM

## 2021-01-20 DIAGNOSIS — M25.561 PAIN IN BOTH KNEES, UNSPECIFIED CHRONICITY: ICD-10-CM

## 2021-01-20 DIAGNOSIS — M25.562 PAIN IN BOTH KNEES, UNSPECIFIED CHRONICITY: ICD-10-CM

## 2021-01-20 DIAGNOSIS — M17.0 PRIMARY OSTEOARTHRITIS OF BOTH KNEES: Primary | ICD-10-CM

## 2021-01-20 PROCEDURE — 3074F SYST BP LT 130 MM HG: CPT | Performed by: ORTHOPAEDIC SURGERY

## 2021-01-20 PROCEDURE — 20610 DRAIN/INJ JOINT/BURSA W/O US: CPT | Performed by: ORTHOPAEDIC SURGERY

## 2021-01-20 PROCEDURE — 73562 X-RAY EXAM OF KNEE 3: CPT | Performed by: ORTHOPAEDIC SURGERY

## 2021-01-20 PROCEDURE — 99204 OFFICE O/P NEW MOD 45 MIN: CPT | Performed by: ORTHOPAEDIC SURGERY

## 2021-01-20 PROCEDURE — 3078F DIAST BP <80 MM HG: CPT | Performed by: ORTHOPAEDIC SURGERY

## 2021-01-20 RX ORDER — TRIAMCINOLONE ACETONIDE 40 MG/ML
40 INJECTION, SUSPENSION INTRA-ARTICULAR; INTRAMUSCULAR
Status: COMPLETED | OUTPATIENT
Start: 2021-01-20 | End: 2021-01-20

## 2021-01-20 RX ADMIN — TRIAMCINOLONE ACETONIDE 40 MG: 40 INJECTION, SUSPENSION INTRA-ARTICULAR; INTRAMUSCULAR at 17:12:00

## 2021-01-20 RX ADMIN — TRIAMCINOLONE ACETONIDE 40 MG: 40 INJECTION, SUSPENSION INTRA-ARTICULAR; INTRAMUSCULAR at 17:11:00

## 2021-01-20 NOTE — PROGRESS NOTES
Per verbal order from LILLIANA Torres, draw up 3ml of 0.5% Marcaine & 2ml 1% lidocaine and 1ml of Kenalog 40 x 2 for cortisone injection to bilateral knee. Rosibel Pan    Patient provided education handout for cortisone injection.    Patient lef

## 2021-01-21 NOTE — PROGRESS NOTES
NURSING INTAKE COMMENTS: Patient presents with:  Knee Pain: Bilateral - onset 3 years ago - no injury - has pain in the anterior aspect of the knees rated as 7/10 on and off -       HPI: This 60year old male presents today with complaints of bilateral kne Mother    • Arthritis Mother         possible arthritis   • Neurological Disorder Mother         possible Parkinson's   • Diabetes Brother    • Rash Brother         eczema       Social History    Occupational History      Not on file    Tobacco Use      Sm right knee. Mild pain with passive range of motion. Ligament exam normal.  No pain with passive range of motion of the hips. Neurological: Light touch and pinprick sensation intact throughout the lower extremities.   Ankle dorsiflexion plantarflexion EHL Plan:  Diagnoses and all orders for this visit:    Primary osteoarthritis of both knees  -     DRAIN/INJECT LARGE JOINT/BURSA  -     triamcinolone acetonide (KENALOG-40) 40 MG/ML injection 40 mg    Pain in both knees, unspecified chronicity  -     XR KNEE

## 2021-02-08 ENCOUNTER — OFFICE VISIT (OUTPATIENT)
Dept: SURGERY | Facility: CLINIC | Age: 56
End: 2021-02-08
Payer: COMMERCIAL

## 2021-02-08 VITALS
BODY MASS INDEX: 41.31 KG/M2 | DIASTOLIC BLOOD PRESSURE: 76 MMHG | RESPIRATION RATE: 18 BRPM | OXYGEN SATURATION: 94 % | HEART RATE: 80 BPM | HEIGHT: 72 IN | SYSTOLIC BLOOD PRESSURE: 125 MMHG | WEIGHT: 305 LBS

## 2021-02-08 DIAGNOSIS — K76.0 NAFLD (NONALCOHOLIC FATTY LIVER DISEASE): Primary | ICD-10-CM

## 2021-02-08 RX ORDER — UBIDECARENONE/VITAMIN E MIXED 100 MG-100
CAPSULE ORAL
COMMUNITY

## 2021-02-08 RX ORDER — GARLIC EXTRACT 500 MG
1 CAPSULE ORAL DAILY
COMMUNITY

## 2021-02-08 RX ORDER — MULTIVIT-MIN/IRON/FOLIC ACID/K 18-600-40
CAPSULE ORAL
COMMUNITY

## 2021-02-08 NOTE — PROGRESS NOTES
[facility] at [facility]  [address], [address]  [address], [address]  [phone] ([phone]) Onset   • Cancer Father         bladder   • Diabetes Father         DM   • Heart Disease Father         CAD   • Cataracts Father         cataract surgery   • Dementia Mother    • Arthritis Mother         possible arthritis   • Neurological Disorder Mother truly lower stage fibrosis. - Discussed that other labs not concerning for advanced fibrosis and given his age it may be worth getting liver biopsy to get more definitive fibrosis staging to help with deciding ongoing management and also for prognosis.  He

## 2021-02-22 ENCOUNTER — LAB ENCOUNTER (OUTPATIENT)
Dept: LAB | Facility: HOSPITAL | Age: 56
End: 2021-02-22
Attending: INTERNAL MEDICINE
Payer: COMMERCIAL

## 2021-02-22 DIAGNOSIS — K76.0 NAFLD (NONALCOHOLIC FATTY LIVER DISEASE): ICD-10-CM

## 2021-02-23 LAB — SARS-COV-2 RNA RESP QL NAA+PROBE: NOT DETECTED

## 2021-02-25 ENCOUNTER — HOSPITAL ENCOUNTER (OUTPATIENT)
Dept: INTERVENTIONAL RADIOLOGY/VASCULAR | Facility: HOSPITAL | Age: 56
Discharge: HOME | End: 2021-02-25
Attending: RADIOLOGY | Admitting: RADIOLOGY
Payer: COMMERCIAL

## 2021-02-25 VITALS
OXYGEN SATURATION: 98 % | BODY MASS INDEX: 41 KG/M2 | HEART RATE: 69 BPM | SYSTOLIC BLOOD PRESSURE: 128 MMHG | WEIGHT: 305 LBS | TEMPERATURE: 99 F | DIASTOLIC BLOOD PRESSURE: 78 MMHG | RESPIRATION RATE: 20 BRPM

## 2021-02-25 DIAGNOSIS — K76.0 NAFLD (NONALCOHOLIC FATTY LIVER DISEASE): ICD-10-CM

## 2021-02-25 PROCEDURE — 36415 COLL VENOUS BLD VENIPUNCTURE: CPT

## 2021-02-25 PROCEDURE — 76942 ECHO GUIDE FOR BIOPSY: CPT

## 2021-02-25 PROCEDURE — BF45ZZZ ULTRASONOGRAPHY OF LIVER: ICD-10-PCS | Performed by: RADIOLOGY

## 2021-02-25 PROCEDURE — 0FB13ZX EXCISION OF RIGHT LOBE LIVER, PERCUTANEOUS APPROACH, DIAGNOSTIC: ICD-10-PCS | Performed by: RADIOLOGY

## 2021-02-25 PROCEDURE — 88313 SPECIAL STAINS GROUP 2: CPT | Performed by: INTERNAL MEDICINE

## 2021-02-25 PROCEDURE — 47000 NEEDLE BIOPSY OF LIVER PERQ: CPT

## 2021-02-25 PROCEDURE — 99152 MOD SED SAME PHYS/QHP 5/>YRS: CPT

## 2021-02-25 PROCEDURE — 88307 TISSUE EXAM BY PATHOLOGIST: CPT | Performed by: INTERNAL MEDICINE

## 2021-02-25 RX ORDER — SODIUM CHLORIDE 9 MG/ML
INJECTION, SOLUTION INTRAVENOUS CONTINUOUS
Status: DISCONTINUED | OUTPATIENT
Start: 2021-02-25 | End: 2021-02-25

## 2021-02-25 RX ORDER — MIDAZOLAM HYDROCHLORIDE 1 MG/ML
INJECTION INTRAMUSCULAR; INTRAVENOUS
Status: DISCONTINUED
Start: 2021-02-25 | End: 2021-02-25

## 2021-02-25 RX ADMIN — SODIUM CHLORIDE: 9 INJECTION, SOLUTION INTRAVENOUS at 08:49:00

## 2021-02-25 NOTE — PROGRESS NOTES
Pt underwent elective US guided liver biopsy with Dr Veronica Reinoso and Serena Smith RN and Zunilda Nina RN. IV conscious sedation administered per order, vitals stable throughout procedure. No complications during procedure, site clean dry and intact. 4ml of lidocaine given lo

## 2021-02-25 NOTE — PROGRESS NOTES
Discharge instructions reviewed with patient and brother at this time. All questions and concerns addressed. All parties verbalized understanding of plan of care via teach back method.  At discharge vital signs were stable on room air, incision dressing was

## 2021-03-12 DIAGNOSIS — Z23 NEED FOR VACCINATION: ICD-10-CM

## 2021-03-22 ENCOUNTER — OFFICE VISIT (OUTPATIENT)
Dept: SURGERY | Facility: CLINIC | Age: 56
End: 2021-03-22
Payer: COMMERCIAL

## 2021-03-22 VITALS
SYSTOLIC BLOOD PRESSURE: 121 MMHG | WEIGHT: 303 LBS | DIASTOLIC BLOOD PRESSURE: 77 MMHG | RESPIRATION RATE: 16 BRPM | BODY MASS INDEX: 41.04 KG/M2 | OXYGEN SATURATION: 93 % | HEART RATE: 91 BPM | HEIGHT: 72 IN

## 2021-03-22 DIAGNOSIS — K75.81 STEATOHEPATITIS: ICD-10-CM

## 2021-03-22 DIAGNOSIS — R79.89 ELEVATED LIVER FUNCTION TESTS: Primary | ICD-10-CM

## 2021-03-22 NOTE — PROGRESS NOTES
[facility] at [facility]  [address], [address]  [address]  [phone] ([phone]) Hemorrhoids 2001     colonoscopy, nl colonoscopy 2013   • Obesity, unspecified        Past Surgical History:   Procedure Laterality Date   • COLONOSCOPY   2001     colonoscopy   • COLONOSCOPY   3/22/2013     nl colonoscopy    • LASIK   1999     \"lasix eye non-tender, no hepatosplenomegaly  Derm: no rash  Neuro: A&Ox3, no asterixis  Psych: normal affect/mood    DATA  Biopsy 2/25/20  Final Diagnosis:      Liver; core biopsy:  · Benign liver parenchyma mild steatohepatitis (grade 1), and focal perisinusoidal f goal to lose at least  5%-10% of weight  - Discussed patient's concern that statin and/or statin with grapefruit might be the cause of LFT elevation.  Explained that statin can sometimes cause elevation in liver enzymes; however, given biopsy results and we

## 2021-03-31 ENCOUNTER — PATIENT MESSAGE (OUTPATIENT)
Dept: FAMILY MEDICINE CLINIC | Facility: CLINIC | Age: 56
End: 2021-03-31

## 2021-03-31 DIAGNOSIS — G25.81 RLS (RESTLESS LEGS SYNDROME): Primary | ICD-10-CM

## 2021-03-31 NOTE — TELEPHONE ENCOUNTER
From: Krysten Cueto  To: Lima Avilez MD  Sent: 3/31/2021 2:51 PM CDT  Subject: Non-Urgent Medical Question    Hi Doc    I just spoke with a sleep specialist about another sleep study I am going to do so that I can qualify for a special mouth guard cherelle

## 2021-03-31 NOTE — TELEPHONE ENCOUNTER
Please see My Chart message and advise. Thank you.       Pt was informed he has an order in his chart for the covid vaccine and provided number to schedule

## 2021-04-07 ENCOUNTER — OFFICE VISIT (OUTPATIENT)
Dept: FAMILY MEDICINE CLINIC | Facility: CLINIC | Age: 56
End: 2021-04-07
Payer: COMMERCIAL

## 2021-04-07 ENCOUNTER — LAB ENCOUNTER (OUTPATIENT)
Dept: LAB | Age: 56
End: 2021-04-07
Attending: FAMILY MEDICINE
Payer: COMMERCIAL

## 2021-04-07 VITALS
WEIGHT: 301.81 LBS | HEART RATE: 89 BPM | TEMPERATURE: 97 F | HEIGHT: 72 IN | SYSTOLIC BLOOD PRESSURE: 111 MMHG | BODY MASS INDEX: 40.88 KG/M2 | RESPIRATION RATE: 18 BRPM | DIASTOLIC BLOOD PRESSURE: 76 MMHG

## 2021-04-07 DIAGNOSIS — M67.442 GANGLION, FINGER JOINT OF LEFT HAND: ICD-10-CM

## 2021-04-07 DIAGNOSIS — N40.1 BPH ASSOCIATED WITH NOCTURIA: ICD-10-CM

## 2021-04-07 DIAGNOSIS — G25.81 RLS (RESTLESS LEGS SYNDROME): Primary | ICD-10-CM

## 2021-04-07 DIAGNOSIS — G47.33 OSA (OBSTRUCTIVE SLEEP APNEA): ICD-10-CM

## 2021-04-07 DIAGNOSIS — R35.1 BPH ASSOCIATED WITH NOCTURIA: ICD-10-CM

## 2021-04-07 DIAGNOSIS — M65.30 TRIGGER FINGER OF LEFT HAND, UNSPECIFIED FINGER: ICD-10-CM

## 2021-04-07 DIAGNOSIS — K76.0 HEPATIC STEATOSIS: ICD-10-CM

## 2021-04-07 DIAGNOSIS — M17.0 PRIMARY OSTEOARTHRITIS OF BOTH KNEES: ICD-10-CM

## 2021-04-07 PROCEDURE — 80048 BASIC METABOLIC PNL TOTAL CA: CPT | Performed by: FAMILY MEDICINE

## 2021-04-07 PROCEDURE — 82728 ASSAY OF FERRITIN: CPT | Performed by: FAMILY MEDICINE

## 2021-04-07 PROCEDURE — 3008F BODY MASS INDEX DOCD: CPT | Performed by: FAMILY MEDICINE

## 2021-04-07 PROCEDURE — 85025 COMPLETE CBC W/AUTO DIFF WBC: CPT | Performed by: FAMILY MEDICINE

## 2021-04-07 PROCEDURE — 99214 OFFICE O/P EST MOD 30 MIN: CPT | Performed by: FAMILY MEDICINE

## 2021-04-07 PROCEDURE — 84466 ASSAY OF TRANSFERRIN: CPT | Performed by: FAMILY MEDICINE

## 2021-04-07 PROCEDURE — 36415 COLL VENOUS BLD VENIPUNCTURE: CPT | Performed by: FAMILY MEDICINE

## 2021-04-07 PROCEDURE — 3078F DIAST BP <80 MM HG: CPT | Performed by: FAMILY MEDICINE

## 2021-04-07 PROCEDURE — 83540 ASSAY OF IRON: CPT | Performed by: FAMILY MEDICINE

## 2021-04-07 PROCEDURE — 3074F SYST BP LT 130 MM HG: CPT | Performed by: FAMILY MEDICINE

## 2021-04-07 RX ORDER — TAMSULOSIN HYDROCHLORIDE 0.4 MG/1
0.8 CAPSULE ORAL DAILY
Qty: 60 CAPSULE | Refills: 5 | Status: SHIPPED | OUTPATIENT
Start: 2021-04-07 | End: 2021-05-07

## 2021-04-07 NOTE — PROGRESS NOTES
HPI/Subjective:   Patient ID: Bret Handley is a 60year old male.     Patient presents to follow-up on results of sleep study, liver biopsy, hand problems, urinary issues and knee pain as below      History/Other:   Review of Systems  Current Outpatient M pharmacologic treatment at this time. NGOC (obstructive sleep apnea)–planning to get oral appliance for bruxism and NGOC. Trigger finger of left hand, unspecified finger–left index and middle finger.   Discussed possibility of hand specialist for cortis

## 2021-04-12 ENCOUNTER — IMMUNIZATION (OUTPATIENT)
Dept: LAB | Age: 56
End: 2021-04-12
Attending: HOSPITALIST
Payer: COMMERCIAL

## 2021-04-12 DIAGNOSIS — Z23 NEED FOR VACCINATION: Primary | ICD-10-CM

## 2021-04-12 PROCEDURE — 0001A SARSCOV2 VAC 30MCG/0.3ML IM: CPT

## 2021-05-05 ENCOUNTER — IMMUNIZATION (OUTPATIENT)
Dept: LAB | Age: 56
End: 2021-05-05
Attending: HOSPITALIST
Payer: COMMERCIAL

## 2021-05-05 ENCOUNTER — OFFICE VISIT (OUTPATIENT)
Dept: FAMILY MEDICINE CLINIC | Facility: CLINIC | Age: 56
End: 2021-05-05
Payer: COMMERCIAL

## 2021-05-05 VITALS
HEART RATE: 111 BPM | RESPIRATION RATE: 20 BRPM | DIASTOLIC BLOOD PRESSURE: 79 MMHG | SYSTOLIC BLOOD PRESSURE: 120 MMHG | BODY MASS INDEX: 40.77 KG/M2 | WEIGHT: 301 LBS | HEIGHT: 72 IN | TEMPERATURE: 98 F

## 2021-05-05 DIAGNOSIS — M17.11 PRIMARY OSTEOARTHRITIS OF RIGHT KNEE: Primary | ICD-10-CM

## 2021-05-05 DIAGNOSIS — Z23 NEED FOR VACCINATION: Primary | ICD-10-CM

## 2021-05-05 PROCEDURE — 3008F BODY MASS INDEX DOCD: CPT | Performed by: FAMILY MEDICINE

## 2021-05-05 PROCEDURE — 3078F DIAST BP <80 MM HG: CPT | Performed by: FAMILY MEDICINE

## 2021-05-05 PROCEDURE — 3074F SYST BP LT 130 MM HG: CPT | Performed by: FAMILY MEDICINE

## 2021-05-05 PROCEDURE — 20605 DRAIN/INJ JOINT/BURSA W/O US: CPT | Performed by: FAMILY MEDICINE

## 2021-05-05 PROCEDURE — 0002A SARSCOV2 VAC 30MCG/0.3ML IM: CPT

## 2021-05-05 RX ORDER — TRIAMCINOLONE ACETONIDE 40 MG/ML
40 INJECTION, SUSPENSION INTRA-ARTICULAR; INTRAMUSCULAR ONCE
Status: COMPLETED | OUTPATIENT
Start: 2021-05-05 | End: 2021-05-05

## 2021-05-05 RX ADMIN — TRIAMCINOLONE ACETONIDE 40 MG: 40 INJECTION, SUSPENSION INTRA-ARTICULAR; INTRAMUSCULAR at 12:18:00

## 2021-05-05 NOTE — PROCEDURES
Procedure explained, informed consent obtained. Aseptic technique. Superior lateral approach, aspiration without fluid. Injection of 4 cc 1% lidocaine, 1 cc Kenalog 40. Tolerated well. Postprocedure instructions given.   Call if any increasing pain, re

## 2021-05-05 NOTE — PROGRESS NOTES
HPI/Subjective:   Patient ID: Lynne Rizo is a 60year old male.     History of right knee arthritis with stiffness and pain      History/Other:   Review of Systems  Current Outpatient Medications   Medication Sig Dispense Refill   • tamsulosin (FLOMAX)

## 2021-05-19 ENCOUNTER — OFFICE VISIT (OUTPATIENT)
Dept: FAMILY MEDICINE CLINIC | Facility: CLINIC | Age: 56
End: 2021-05-19
Payer: COMMERCIAL

## 2021-05-19 VITALS
BODY MASS INDEX: 40.31 KG/M2 | TEMPERATURE: 97 F | DIASTOLIC BLOOD PRESSURE: 76 MMHG | SYSTOLIC BLOOD PRESSURE: 124 MMHG | WEIGHT: 297.63 LBS | RESPIRATION RATE: 18 BRPM | HEART RATE: 84 BPM | HEIGHT: 72 IN

## 2021-05-19 DIAGNOSIS — N40.1 BPH ASSOCIATED WITH NOCTURIA: ICD-10-CM

## 2021-05-19 DIAGNOSIS — R35.1 BPH ASSOCIATED WITH NOCTURIA: ICD-10-CM

## 2021-05-19 DIAGNOSIS — M17.12 PRIMARY OSTEOARTHRITIS OF LEFT KNEE: Primary | ICD-10-CM

## 2021-05-19 PROCEDURE — 3008F BODY MASS INDEX DOCD: CPT | Performed by: FAMILY MEDICINE

## 2021-05-19 PROCEDURE — 3074F SYST BP LT 130 MM HG: CPT | Performed by: FAMILY MEDICINE

## 2021-05-19 PROCEDURE — 3078F DIAST BP <80 MM HG: CPT | Performed by: FAMILY MEDICINE

## 2021-05-19 PROCEDURE — 20610 DRAIN/INJ JOINT/BURSA W/O US: CPT | Performed by: FAMILY MEDICINE

## 2021-05-19 RX ORDER — TRIAMCINOLONE ACETONIDE 40 MG/ML
40 INJECTION, SUSPENSION INTRA-ARTICULAR; INTRAMUSCULAR ONCE
Status: COMPLETED | OUTPATIENT
Start: 2021-05-19 | End: 2021-05-19

## 2021-05-19 RX ADMIN — TRIAMCINOLONE ACETONIDE 40 MG: 40 INJECTION, SUSPENSION INTRA-ARTICULAR; INTRAMUSCULAR at 13:03:00

## 2021-05-19 NOTE — PROGRESS NOTES
HPI/Subjective:   Patient ID: Jahaira Meehan is a 60year old male.     Patient presents for left knee injection as below      History/Other:   Review of Systems  Current Outpatient Medications   Medication Sig Dispense Refill   • Cholecalciferol (VITAMIN D

## 2021-07-28 RX ORDER — SIMVASTATIN 40 MG
TABLET ORAL
Qty: 90 TABLET | Refills: 3 | Status: SHIPPED | OUTPATIENT
Start: 2021-07-28 | End: 2022-07-29

## 2021-09-27 ENCOUNTER — LAB ENCOUNTER (OUTPATIENT)
Dept: LAB | Facility: HOSPITAL | Age: 56
End: 2021-09-27
Attending: INTERNAL MEDICINE
Payer: COMMERCIAL

## 2021-09-27 ENCOUNTER — OFFICE VISIT (OUTPATIENT)
Dept: SURGERY | Facility: CLINIC | Age: 56
End: 2021-09-27
Payer: COMMERCIAL

## 2021-09-27 VITALS
WEIGHT: 290 LBS | BODY MASS INDEX: 39.28 KG/M2 | SYSTOLIC BLOOD PRESSURE: 119 MMHG | RESPIRATION RATE: 18 BRPM | OXYGEN SATURATION: 93 % | HEIGHT: 72 IN | DIASTOLIC BLOOD PRESSURE: 75 MMHG | HEART RATE: 75 BPM

## 2021-09-27 DIAGNOSIS — R79.89 ELEVATED LIVER FUNCTION TESTS: Primary | ICD-10-CM

## 2021-09-27 DIAGNOSIS — K76.0 NAFLD (NONALCOHOLIC FATTY LIVER DISEASE): ICD-10-CM

## 2021-09-27 LAB
ALBUMIN SERPL-MCNC: 3.8 G/DL (ref 3.4–5)
ALP LIVER SERPL-CCNC: 80 U/L
ALT SERPL-CCNC: 27 U/L
AST SERPL-CCNC: 20 U/L (ref 15–37)
BILIRUB DIRECT SERPL-MCNC: 0.2 MG/DL (ref 0–0.2)
BILIRUB SERPL-MCNC: 0.6 MG/DL (ref 0.1–2)
HAV AB SER QL IA: NONREACTIVE
PROT SERPL-MCNC: 7.7 G/DL (ref 6.4–8.2)

## 2021-09-27 PROCEDURE — 80076 HEPATIC FUNCTION PANEL: CPT | Performed by: INTERNAL MEDICINE

## 2021-09-27 PROCEDURE — 36415 COLL VENOUS BLD VENIPUNCTURE: CPT | Performed by: INTERNAL MEDICINE

## 2021-09-27 PROCEDURE — 86708 HEPATITIS A ANTIBODY: CPT | Performed by: INTERNAL MEDICINE

## 2021-09-27 RX ORDER — TAMSULOSIN HYDROCHLORIDE 0.4 MG/1
CAPSULE ORAL
COMMUNITY
Start: 2021-09-03

## 2021-09-27 NOTE — PROGRESS NOTES
[facility] at [facility]  [address] [facility] [address], [address]  [address]  [phone] ([phone]) STATED]   • UPPER GI ENDOSCOPY,BIOPSY   3/22/2013     gastroesophageal reflux disease               Family History   Problem Relation Age of Onset   • Cancer Father           bladder   • Diabetes Father           DM   • Heart Disease Father           CAD biopsy:  · Benign liver parenchyma mild steatohepatitis (grade 1), and focal perisinusoidal fibrosis (stage 1)  · See comment     Comment: The liver parenchyma exhibits moderate macrovesicular and microvesicular steatosis, occupying approximately 40% of th Immune to HBV; not immune to HAV,received HAV vaccination x 1 June '20; will check HAV Ab with next blood draw. If negative then would repeat dose of hepatitis A vaccine  - Continue to minimize ETOH use  - Follow up 1 year    Channing Figueredo MD  Director

## 2021-11-17 ENCOUNTER — OFFICE VISIT (OUTPATIENT)
Dept: FAMILY MEDICINE CLINIC | Facility: CLINIC | Age: 56
End: 2021-11-17
Payer: COMMERCIAL

## 2021-11-17 VITALS
SYSTOLIC BLOOD PRESSURE: 112 MMHG | RESPIRATION RATE: 18 BRPM | WEIGHT: 298.63 LBS | HEART RATE: 80 BPM | BODY MASS INDEX: 41 KG/M2 | TEMPERATURE: 98 F | DIASTOLIC BLOOD PRESSURE: 76 MMHG

## 2021-11-17 DIAGNOSIS — M17.12 PRIMARY OSTEOARTHRITIS OF LEFT KNEE: Primary | ICD-10-CM

## 2021-11-17 PROCEDURE — 20610 DRAIN/INJ JOINT/BURSA W/O US: CPT | Performed by: FAMILY MEDICINE

## 2021-11-17 PROCEDURE — 3078F DIAST BP <80 MM HG: CPT | Performed by: FAMILY MEDICINE

## 2021-11-17 PROCEDURE — 3074F SYST BP LT 130 MM HG: CPT | Performed by: FAMILY MEDICINE

## 2021-11-17 RX ORDER — TRIAMCINOLONE ACETONIDE 40 MG/ML
40 INJECTION, SUSPENSION INTRA-ARTICULAR; INTRAMUSCULAR ONCE
Status: COMPLETED | OUTPATIENT
Start: 2021-11-17 | End: 2021-11-17

## 2021-11-17 RX ADMIN — TRIAMCINOLONE ACETONIDE 40 MG: 40 INJECTION, SUSPENSION INTRA-ARTICULAR; INTRAMUSCULAR at 13:04:00

## 2021-11-17 NOTE — PROCEDURES
Procedure explained, informed consent obtained. Aseptic technique. Superior lateral approach, aspiration with 3 cc thick clear. Injection of 4 cc 1% lidocaine, 1 cc Kenalog 40. Tolerated well. Postprocedure instructions given.   Call if any increasing

## 2021-11-17 NOTE — PROGRESS NOTES
Subjective:   Patient ID: Harper France is a 60year old male. Patient presents for injection of left knee.     Consult        History/Other:   Review of Systems  Current Outpatient Medications   Medication Sig Dispense Refill   • tamsulosin (FLOMAX) ca

## 2021-12-01 ENCOUNTER — OFFICE VISIT (OUTPATIENT)
Dept: FAMILY MEDICINE CLINIC | Facility: CLINIC | Age: 56
End: 2021-12-01
Payer: COMMERCIAL

## 2021-12-01 VITALS
SYSTOLIC BLOOD PRESSURE: 128 MMHG | BODY MASS INDEX: 39.06 KG/M2 | TEMPERATURE: 97 F | DIASTOLIC BLOOD PRESSURE: 69 MMHG | WEIGHT: 288.38 LBS | HEART RATE: 85 BPM | HEIGHT: 72 IN

## 2021-12-01 DIAGNOSIS — M17.11 PRIMARY OSTEOARTHRITIS OF RIGHT KNEE: Primary | ICD-10-CM

## 2021-12-01 DIAGNOSIS — I87.2 STASIS DERMATITIS OF BOTH LEGS: ICD-10-CM

## 2021-12-01 DIAGNOSIS — R60.0 BILATERAL LEG EDEMA: ICD-10-CM

## 2021-12-01 PROCEDURE — 3074F SYST BP LT 130 MM HG: CPT | Performed by: FAMILY MEDICINE

## 2021-12-01 PROCEDURE — 3078F DIAST BP <80 MM HG: CPT | Performed by: FAMILY MEDICINE

## 2021-12-01 PROCEDURE — 20610 DRAIN/INJ JOINT/BURSA W/O US: CPT | Performed by: FAMILY MEDICINE

## 2021-12-01 PROCEDURE — 3008F BODY MASS INDEX DOCD: CPT | Performed by: FAMILY MEDICINE

## 2021-12-01 RX ORDER — TRIAMCINOLONE ACETONIDE 40 MG/ML
40 INJECTION, SUSPENSION INTRA-ARTICULAR; INTRAMUSCULAR ONCE
Status: COMPLETED | OUTPATIENT
Start: 2021-12-01 | End: 2021-12-03

## 2021-12-01 RX ORDER — PENICILLIN V POTASSIUM 500 MG/1
500 TABLET ORAL 3 TIMES DAILY
Qty: 15 TABLET | Refills: 0 | Status: SHIPPED | OUTPATIENT
Start: 2021-12-01 | End: 2021-12-06

## 2021-12-01 NOTE — PROGRESS NOTES
Subjective:   Patient ID: Tara Ricardo is a 60year old male. Patient presents for knee injection and issues as below.       History/Other:   Review of Systems  Current Outpatient Medications   Medication Sig Dispense Refill   • penicillin v potassium dermatitis of both legs–recommend use of Aquaphor nightly long-term. Today patient noticed some erythematous nodules anterior left lower leg. Slightly tender on exam.  Rule out early erysipelas/cellulitis versus worsening stasis dermatitis.   Penicillin a

## 2021-12-03 RX ADMIN — TRIAMCINOLONE ACETONIDE 40 MG: 40 INJECTION, SUSPENSION INTRA-ARTICULAR; INTRAMUSCULAR at 07:43:00

## 2021-12-13 ENCOUNTER — OFFICE VISIT (OUTPATIENT)
Dept: SURGERY | Facility: CLINIC | Age: 56
End: 2021-12-13
Payer: COMMERCIAL

## 2021-12-13 VITALS
OXYGEN SATURATION: 95 % | DIASTOLIC BLOOD PRESSURE: 80 MMHG | SYSTOLIC BLOOD PRESSURE: 132 MMHG | WEIGHT: 296 LBS | HEART RATE: 91 BPM | BODY MASS INDEX: 41.44 KG/M2 | HEIGHT: 71 IN

## 2021-12-13 DIAGNOSIS — E78.00 HYPERCHOLESTEROLEMIA: Primary | ICD-10-CM

## 2021-12-13 DIAGNOSIS — M15.9 GENERALIZED OSTEOARTHROSIS, INVOLVING MULTIPLE SITES: ICD-10-CM

## 2021-12-13 DIAGNOSIS — R06.83 SNORING: ICD-10-CM

## 2021-12-13 DIAGNOSIS — G47.30 SLEEP APNEA, UNSPECIFIED TYPE: ICD-10-CM

## 2021-12-13 DIAGNOSIS — K21.9 GASTROESOPHAGEAL REFLUX DISEASE, UNSPECIFIED WHETHER ESOPHAGITIS PRESENT: ICD-10-CM

## 2021-12-13 DIAGNOSIS — I87.2 VENOUS (PERIPHERAL) INSUFFICIENCY: ICD-10-CM

## 2021-12-13 DIAGNOSIS — E66.01 MORBID OBESITY WITH BMI OF 40.0-44.9, ADULT (HCC): ICD-10-CM

## 2021-12-13 DIAGNOSIS — R63.5 WEIGHT GAIN: ICD-10-CM

## 2021-12-13 DIAGNOSIS — K76.0 HEPATIC STEATOSIS: ICD-10-CM

## 2021-12-13 DIAGNOSIS — M54.50 LOW BACK PAIN, UNSPECIFIED BACK PAIN LATERALITY, UNSPECIFIED CHRONICITY, UNSPECIFIED WHETHER SCIATICA PRESENT: ICD-10-CM

## 2021-12-13 PROCEDURE — 3008F BODY MASS INDEX DOCD: CPT | Performed by: NURSE PRACTITIONER

## 2021-12-13 PROCEDURE — 3075F SYST BP GE 130 - 139MM HG: CPT | Performed by: NURSE PRACTITIONER

## 2021-12-13 PROCEDURE — 3079F DIAST BP 80-89 MM HG: CPT | Performed by: NURSE PRACTITIONER

## 2021-12-13 PROCEDURE — 99204 OFFICE O/P NEW MOD 45 MIN: CPT | Performed by: NURSE PRACTITIONER

## 2021-12-13 RX ORDER — TOPIRAMATE 25 MG/1
25 TABLET ORAL 2 TIMES DAILY
Qty: 60 TABLET | Refills: 1 | Status: SHIPPED | OUTPATIENT
Start: 2021-12-13 | End: 2022-02-04

## 2021-12-13 NOTE — PATIENT INSTRUCTIONS
Start 25 mg topiramate in the evening for cravings and to assist weight loss, increase to twice a day as tolerated. We will start a stimulant next visit. My Fitness Pal. Lose It. Daily Calories:  120-174 lbs: 1200 calories/day.   175-219 lbs: 15 and drink. 6. Find a regular pattern for meals. Aim for 3 healthy meals a day with 1-2 healthy snacks. 7. Plan when, where, and what you will eat at each meal in advance to make sure you do not go too many hours without eating.    8. Include lean protein

## 2021-12-13 NOTE — PROGRESS NOTES
[facility] Consultation Note       Date of Consult:  2021    Patient:  Renee Orellana  :      1/10/1965  MRN:      [id number]    Referring Provider: Dr. Adalgisa Day       Chief Complaint:  Patient presents with:  Consult  Weight Man kg)  05/05/21 : (!) 301 lb (136.5 kg)       Patient Medications:    Current Outpatient Medications   Medication Sig Dispense Refill   • topiramate 25 MG Oral Tab Take 1 tablet (25 mg total) by mouth 2 (two) times daily.  60 tablet 1   • triamcinolone 0.1 % Concern: Not Asked        Special Diet: Not Asked        Back Care: Not Asked        Exercise: Not Asked        Bike Helmet: Not Asked        Seat Belt: Not Asked        Self-Exams: Not Asked    Social History Narrative      Not on file    Social Determina carbohydrates to  gms per day, Eat 100-200 calories within 1 hour of waking up and Eat 3-4 cups of fresh fruit or vegetables daily    Behavior Modifications Reviewed and Discussed:    Eat breakfast, Eat 3 meals per day, Plan meals in advance, Read nu sciatica present  Snoring  Sleep apnea, unspecified type  Generalized osteoarthrosis, involving multiple sites    PLAN       Diagnoses and all orders for this visit:    Hypercholesterolemia  -     OP REFERRAL JUMP START YOUR HEALTH;  Future    Weight gain Drink 64 ounces of non-caloric beverages per day. No fruit juices or regular soda. 3. Aim for 150 minutes moderate exercise per week. 4. Increase fruit and vegetable servings to 5-6 per day. 5. Improve sleep and stress.      Reviewed labs:   4/7/2021

## 2021-12-27 RX ORDER — CELECOXIB 200 MG/1
CAPSULE ORAL
Qty: 90 CAPSULE | Refills: 0 | Status: SHIPPED | OUTPATIENT
Start: 2021-12-27

## 2021-12-28 ENCOUNTER — TELEPHONE (OUTPATIENT)
Dept: FAMILY MEDICINE CLINIC | Facility: CLINIC | Age: 56
End: 2021-12-28

## 2021-12-28 NOTE — TELEPHONE ENCOUNTER
Per [place], Celecoxib 200mg Capsule is not covered under patient's insurance and needs a prior authorization. Please call [phone] for the prior authorization, card jones ID # [id number].

## 2022-01-05 NOTE — TELEPHONE ENCOUNTER
Prior authorization for celecoxib has been approved from 1/4/22 through 1/4/23 pharmacy has been notified.

## 2022-01-12 ENCOUNTER — TELEPHONE (OUTPATIENT)
Dept: SURGERY | Facility: CLINIC | Age: 57
End: 2022-01-12

## 2022-01-12 NOTE — TELEPHONE ENCOUNTER
Per the request of Tommy SALAZAR, called patient and left detailed mesage letting him know below information:  * Surgeons are NOT contracted with [facility]  * Encouraged patient to call insurance directly to get a list of [facility] and

## 2022-01-14 ENCOUNTER — TELEPHONE (OUTPATIENT)
Dept: FAMILY MEDICINE CLINIC | Facility: CLINIC | Age: 57
End: 2022-01-14

## 2022-01-14 ENCOUNTER — PATIENT MESSAGE (OUTPATIENT)
Dept: FAMILY MEDICINE CLINIC | Facility: CLINIC | Age: 57
End: 2022-01-14

## 2022-01-14 DIAGNOSIS — I87.2 VENOUS INSUFFICIENCY OF BOTH LOWER EXTREMITIES: ICD-10-CM

## 2022-01-14 DIAGNOSIS — I87.2 STASIS DERMATITIS OF BOTH LEGS: ICD-10-CM

## 2022-01-14 DIAGNOSIS — I83.11 LIPODERMATOSCLEROSIS OF RIGHT LOWER EXTREMITY: Primary | ICD-10-CM

## 2022-01-14 NOTE — TELEPHONE ENCOUNTER
Love Jarquin RN 1/14/2022 1:07 PM CST        ----- Message -----  From: Wade Ngo  Sent: 1/14/2022 12:37 PM CST  To: Em Rn Triage  Subject: My Right Leg     Dr. Marely Rodriguez    The bruising around my ankle on my right leg has gotten worse, mainly on the

## 2022-01-14 NOTE — TELEPHONE ENCOUNTER
Dr. Estephania Rosen please see pt mychart message below. Pt stated that you had seen it at the office visit on Dec 1, 2021. Pt stated that its like a deep color bruise. Pt did take the abx you had prescribed him and it had no effect.  Pt stated that apply the ice pa

## 2022-01-14 NOTE — TELEPHONE ENCOUNTER
----- Message from Jimbo Hilario RN sent at 1/14/2022  1:07 PM CST -----  Regarding: FW: My Right Leg      ----- Message -----  From: Freeman Kyle  Sent: 1/14/2022  12:37 PM CST  To: Rea Rn Triage  Subject: My Right Leg

## 2022-01-14 NOTE — TELEPHONE ENCOUNTER
Patient contacted. Photo appears right lipodermatosclerosis, bilateral venous stasis dermatitis. At this time recommend compression hose, agree with using it at night as well. Return to vascular surgery who he had seen 4 years ago.   If venous insufficie

## 2022-02-04 RX ORDER — TOPIRAMATE 25 MG/1
25 TABLET ORAL 2 TIMES DAILY
Qty: 60 TABLET | Refills: 0 | Status: SHIPPED | OUTPATIENT
Start: 2022-02-04 | End: 2022-03-16

## 2022-03-16 ENCOUNTER — OFFICE VISIT (OUTPATIENT)
Dept: FAMILY MEDICINE CLINIC | Facility: CLINIC | Age: 57
End: 2022-03-16
Payer: COMMERCIAL

## 2022-03-16 ENCOUNTER — LAB ENCOUNTER (OUTPATIENT)
Dept: LAB | Age: 57
End: 2022-03-16
Attending: FAMILY MEDICINE
Payer: COMMERCIAL

## 2022-03-16 VITALS
DIASTOLIC BLOOD PRESSURE: 79 MMHG | HEIGHT: 71 IN | WEIGHT: 301 LBS | SYSTOLIC BLOOD PRESSURE: 132 MMHG | HEART RATE: 80 BPM | BODY MASS INDEX: 42.14 KG/M2

## 2022-03-16 DIAGNOSIS — Z12.11 COLON CANCER SCREENING: ICD-10-CM

## 2022-03-16 DIAGNOSIS — K76.0 HEPATIC STEATOSIS: ICD-10-CM

## 2022-03-16 DIAGNOSIS — I83.12 LIPODERMATOSCLEROSIS OF LEFT LOWER EXTREMITY: ICD-10-CM

## 2022-03-16 DIAGNOSIS — E66.01 MORBID OBESITY WITH BMI OF 40.0-44.9, ADULT (HCC): ICD-10-CM

## 2022-03-16 DIAGNOSIS — G47.30 SLEEP APNEA, UNSPECIFIED TYPE: ICD-10-CM

## 2022-03-16 DIAGNOSIS — Z80.42 FAMILY HISTORY OF PROSTATE CANCER: ICD-10-CM

## 2022-03-16 DIAGNOSIS — R60.0 BILATERAL LEG EDEMA: ICD-10-CM

## 2022-03-16 DIAGNOSIS — Z00.00 ROUTINE PHYSICAL EXAMINATION: Primary | ICD-10-CM

## 2022-03-16 DIAGNOSIS — M17.0 BILATERAL PRIMARY OSTEOARTHRITIS OF KNEE: ICD-10-CM

## 2022-03-16 DIAGNOSIS — R13.19 OTHER DYSPHAGIA: ICD-10-CM

## 2022-03-16 DIAGNOSIS — K74.00 HEPATIC FIBROSIS: ICD-10-CM

## 2022-03-16 DIAGNOSIS — R35.0 URINARY FREQUENCY: ICD-10-CM

## 2022-03-16 DIAGNOSIS — R43.0 LOSS OF SMELL: ICD-10-CM

## 2022-03-16 PROCEDURE — 3008F BODY MASS INDEX DOCD: CPT | Performed by: FAMILY MEDICINE

## 2022-03-16 PROCEDURE — 3078F DIAST BP <80 MM HG: CPT | Performed by: FAMILY MEDICINE

## 2022-03-16 PROCEDURE — 99396 PREV VISIT EST AGE 40-64: CPT | Performed by: FAMILY MEDICINE

## 2022-03-16 PROCEDURE — 90632 HEPA VACCINE ADULT IM: CPT | Performed by: FAMILY MEDICINE

## 2022-03-16 PROCEDURE — 90471 IMMUNIZATION ADMIN: CPT | Performed by: FAMILY MEDICINE

## 2022-03-16 PROCEDURE — 3075F SYST BP GE 130 - 139MM HG: CPT | Performed by: FAMILY MEDICINE

## 2022-03-19 LAB
ALBUMIN/GLOBULIN RATIO: 1.8 (CALC) (ref 1–2.5)
ALBUMIN: 4.2 G/DL (ref 3.6–5.1)
ALKALINE PHOSPHATASE: 74 U/L (ref 35–144)
ALPHA FETOPROTEIN,$TUMOR MARKER: 2.5 NG/ML
ALT: 28 U/L (ref 9–46)
AST: 30 U/L (ref 10–35)
BILIRUBIN, TOTAL: 0.6 MG/DL (ref 0.2–1.2)
BUN: 16 MG/DL (ref 7–25)
CALCIUM: 9.5 MG/DL (ref 8.6–10.3)
CARBON DIOXIDE: 24 MMOL/L (ref 20–32)
CHLORIDE: 106 MMOL/L (ref 98–110)
CHOL/HDLC RATIO: 3.6 (CALC)
CHOLESTEROL, TOTAL: 159 MG/DL
CREATININE: 0.81 MG/DL (ref 0.7–1.33)
EGFR IF AFRICN AM: 113 ML/MIN/1.73M2
GLOBULIN: 2.3 G/DL (CALC) (ref 1.9–3.7)
GLUCOSE: 153 MG/DL (ref 65–99)
HDL CHOLESTEROL: 44 MG/DL
HEMATOCRIT: 42.6 % (ref 38.5–50)
HEMOGLOBIN A1C: 5.5 % OF TOTAL HGB
HEMOGLOBIN: 14.3 G/DL (ref 13.2–17.1)
LDL-CHOLESTEROL: 92 MG/DL (CALC)
MCH: 30 PG (ref 27–33)
MCHC: 33.6 G/DL (ref 32–36)
MCV: 89.3 FL (ref 80–100)
MPV: 11.5 FL (ref 7.5–12.5)
NON-HDL CHOLESTEROL: 115 MG/DL (CALC)
PLATELET COUNT: 247 THOUSAND/UL (ref 140–400)
POTASSIUM: 4.5 MMOL/L (ref 3.5–5.3)
PROTEIN, TOTAL: 6.5 G/DL (ref 6.1–8.1)
PSA, TOTAL: 1.34 NG/ML
RDW: 12.5 % (ref 11–15)
RED BLOOD CELL COUNT: 4.77 MILLION/UL (ref 4.2–5.8)
SODIUM: 141 MMOL/L (ref 135–146)
TRIGLYCERIDES: 130 MG/DL
TSH: 1.1 MIU/L (ref 0.4–4.5)
WHITE BLOOD CELL COUNT: 8.4 THOUSAND/UL (ref 3.8–10.8)

## 2022-04-01 RX ORDER — CELECOXIB 200 MG/1
200 CAPSULE ORAL DAILY
Qty: 90 CAPSULE | Refills: 1 | Status: SHIPPED | OUTPATIENT
Start: 2022-04-01

## 2022-06-17 NOTE — PATIENT INSTRUCTIONS
Take penicillin 3 times a day for 5 days (for possible early infection)  Apply triamcinolone ointment twice a day for 10 days  Use Aquaphor ointment (over-the-counter) every night on lower legs long-term, helps prevent stasis dermatitis, ulcers Yes

## 2022-07-29 RX ORDER — SIMVASTATIN 40 MG
TABLET ORAL
Qty: 90 TABLET | Refills: 3 | Status: SHIPPED | OUTPATIENT
Start: 2022-07-29 | End: 2023-11-09

## 2022-08-02 ENCOUNTER — OFFICE VISIT (OUTPATIENT)
Dept: FAMILY MEDICINE CLINIC | Facility: CLINIC | Age: 57
End: 2022-08-02
Payer: COMMERCIAL

## 2022-08-02 VITALS
SYSTOLIC BLOOD PRESSURE: 132 MMHG | DIASTOLIC BLOOD PRESSURE: 78 MMHG | HEART RATE: 80 BPM | BODY MASS INDEX: 42 KG/M2 | TEMPERATURE: 98 F | WEIGHT: 299 LBS

## 2022-08-02 DIAGNOSIS — M17.0 BILATERAL PRIMARY OSTEOARTHRITIS OF KNEE: Primary | ICD-10-CM

## 2022-08-02 DIAGNOSIS — L03.115 CELLULITIS OF RIGHT LOWER EXTREMITY: ICD-10-CM

## 2022-08-02 PROCEDURE — 3078F DIAST BP <80 MM HG: CPT | Performed by: FAMILY MEDICINE

## 2022-08-02 PROCEDURE — 99213 OFFICE O/P EST LOW 20 MIN: CPT | Performed by: FAMILY MEDICINE

## 2022-08-02 PROCEDURE — 20610 DRAIN/INJ JOINT/BURSA W/O US: CPT | Performed by: FAMILY MEDICINE

## 2022-08-02 PROCEDURE — 3075F SYST BP GE 130 - 139MM HG: CPT | Performed by: FAMILY MEDICINE

## 2022-08-02 RX ORDER — TRIAMCINOLONE ACETONIDE 40 MG/ML
40 INJECTION, SUSPENSION INTRA-ARTICULAR; INTRAMUSCULAR ONCE
Status: COMPLETED | OUTPATIENT
Start: 2022-08-02 | End: 2022-08-02

## 2022-08-02 RX ORDER — CEFADROXIL 500 MG/1
500 CAPSULE ORAL 2 TIMES DAILY
Qty: 14 CAPSULE | Refills: 0 | Status: SHIPPED | OUTPATIENT
Start: 2022-08-02 | End: 2022-08-09

## 2022-08-02 RX ADMIN — TRIAMCINOLONE ACETONIDE 40 MG: 40 INJECTION, SUSPENSION INTRA-ARTICULAR; INTRAMUSCULAR at 17:55:00

## 2022-08-02 NOTE — PROCEDURES
Procedure explained, informed consent obtained. Aseptic technique. Superior lateral approach, aspiration without fluid. Injection of 4 cc 1% lidocaine, 1 cc Kenalog 40. Tolerated well. Postprocedure instructions given. Call if any increasing pain, redness, fever.

## 2022-10-13 ENCOUNTER — TELEPHONE (OUTPATIENT)
Dept: FAMILY MEDICINE CLINIC | Facility: CLINIC | Age: 57
End: 2022-10-13

## 2022-10-13 NOTE — TELEPHONE ENCOUNTER
Dr. Flex Lora, this patient has been scheduled for a follow-up regarding lab results but upon chart review your comments on labs in March do not indicate you advising a visit. I do see an order for HA1C. Did you simply want him to get a lab draw or do you want him to see you?     Future Appointments   Date Time Provider Lonnie Rodgers   10/17/2022  1:15 PM Redd Tinoco  [address] [place] [address]

## 2022-10-14 NOTE — TELEPHONE ENCOUNTER
Advised patient of notes below and he asked that we cancel the 10/17/22 appt. Advised we will contact him as needed.

## 2022-10-14 NOTE — TELEPHONE ENCOUNTER
Dr. Anita Silverio, I apologize but I do not see an elevated CRP under labs or scanned into media. Please advise.

## 2022-10-14 NOTE — TELEPHONE ENCOUNTER
My recollection is that this was on a outside screening test that we received about a week ago, so likely it is still being scanned. I do not think it is critical, so if patient would rather wait until next routine visit that is okay.

## 2023-02-03 NOTE — TELEPHONE ENCOUNTER
[facility].   Requested Prescriptions   Pending Prescriptions Disp Refills    CELECOXIB 200 MG Oral Cap [Pharmacy Med Name: Celecoxib 200 Mg Cap Nort] 90 capsule 0     Sig: TAKE ONE CAPSULE BY MOUTH ONE TIME DAILY        Non-Narcotic Pain Medication Protocol Passed - 4/1/2022  2:31 AM        Passed - Appointment in past 6 or next 3 months              Recent Outpatient Visits              2 weeks ago Routine physical examination    [facility], Leida Flores MD    Office Visit    3 months ago Hypercholesterolemia    [address], [address], [place], MURIEL Koehler    Office Visit    4 months ago Primary osteoarthritis of right knee    [facility], [facility], Maxi Lopez MD    Office Visit    4 months ago Primary osteoarthritis of left knee    [facility], [facility], [facility] Steven Rose MD    Office Visit    6 months ago Elevated liver function tests    [facility] Shawna Ibarra MD    Office Visit
No

## 2023-03-28 ENCOUNTER — PATIENT MESSAGE (OUTPATIENT)
Dept: FAMILY MEDICINE CLINIC | Facility: CLINIC | Age: 58
End: 2023-03-28

## 2023-03-28 DIAGNOSIS — Z12.11 COLON CANCER SCREENING: Primary | ICD-10-CM

## 2023-03-29 NOTE — TELEPHONE ENCOUNTER
From: Nghia Chamberlain  To: Lima Gallegos MD  Sent: 3/28/2023 6:34 PM CDT  Subject: Craige Button Dr. Sarah Gallegos    I received a text that you wanted me to schedule an appointment for a Colorectal Cancer Screening, but I do not see any message on My Chart about this. Just reaching out to confirm. If correct, could you supply the appropriate contact information? Thank you!     Chin Loja

## 2023-05-04 ENCOUNTER — HOSPITAL ENCOUNTER (OUTPATIENT)
Dept: ULTRASOUND IMAGING | Facility: HOSPITAL | Age: 58
Discharge: HOME | End: 2023-05-04
Payer: COMMERCIAL

## 2023-05-04 DIAGNOSIS — I87.8 VENOUS STASIS: ICD-10-CM

## 2023-05-04 PROCEDURE — 93970 EXTREMITY STUDY: CPT

## 2023-05-04 PROCEDURE — 93970 EXTREMITY STUDY: CPT | Performed by: STUDENT IN AN ORGANIZED HEALTH CARE EDUCATION/TRAINING PROGRAM

## 2023-05-05 ENCOUNTER — PATIENT MESSAGE (OUTPATIENT)
Dept: FAMILY MEDICINE CLINIC | Facility: CLINIC | Age: 58
End: 2023-05-05

## 2023-05-05 NOTE — TELEPHONE ENCOUNTER
From: Rosetta Balderas  To: Lima Whitten MD  Sent: 5/5/2023 3:53 PM CDT  Subject: Paddy Whitten    Just wanted to make sure that you checkout the testing I had yesterday and both legs showing perfect circulation going to & coming from my heart.     [facility]

## 2023-05-05 NOTE — TELEPHONE ENCOUNTER
[facility]  VENOUS US 5/4/23 ordered by DR Catarino Dunn DPM.         Future Appointments   Date Time Provider Lonnie Zhengi   5/25/2023 11:15 AM Edmond Jefferson MD [address] [place] [address]   7/17/2023 12:30 PM MURIEL Garcia [facility]

## 2023-05-25 ENCOUNTER — PATIENT MESSAGE (OUTPATIENT)
Dept: FAMILY MEDICINE CLINIC | Facility: CLINIC | Age: 58
End: 2023-05-25

## 2023-05-25 NOTE — TELEPHONE ENCOUNTER
I called patient and assisted in scheduling office visit with [facility] per Dr. Justin Han. Patient verbalized understanding. No further questions or concerns at this time.     Future Appointments   Date Time Provider Lonnie Rodgers   6/1/2023 11:45 AM Amado Cai MD [address] [place] [address]   7/17/2023 12:30 PM MURIEL [facility]

## 2023-06-01 ENCOUNTER — LAB ENCOUNTER (OUTPATIENT)
Dept: LAB | Age: 58
End: 2023-06-01
Attending: FAMILY MEDICINE
Payer: COMMERCIAL

## 2023-06-01 ENCOUNTER — OFFICE VISIT (OUTPATIENT)
Dept: FAMILY MEDICINE CLINIC | Facility: CLINIC | Age: 58
End: 2023-06-01

## 2023-06-01 VITALS — SYSTOLIC BLOOD PRESSURE: 138 MMHG | HEART RATE: 75 BPM | TEMPERATURE: 98 F | DIASTOLIC BLOOD PRESSURE: 69 MMHG

## 2023-06-01 DIAGNOSIS — Z12.11 ENCOUNTER FOR COLORECTAL CANCER SCREENING: ICD-10-CM

## 2023-06-01 DIAGNOSIS — I83.893 VARICOSE VEINS OF LEG WITH SWELLING, BILATERAL: ICD-10-CM

## 2023-06-01 DIAGNOSIS — K76.0 HEPATIC STEATOSIS: ICD-10-CM

## 2023-06-01 DIAGNOSIS — M17.0 PRIMARY OSTEOARTHRITIS OF BOTH KNEES: Primary | ICD-10-CM

## 2023-06-01 DIAGNOSIS — R10.13 DYSPEPSIA: ICD-10-CM

## 2023-06-01 DIAGNOSIS — M65.322 TRIGGER INDEX FINGER OF LEFT HAND: ICD-10-CM

## 2023-06-01 DIAGNOSIS — G47.33 OSA (OBSTRUCTIVE SLEEP APNEA): ICD-10-CM

## 2023-06-01 DIAGNOSIS — E66.01 MORBID OBESITY WITH BMI OF 40.0-44.9, ADULT (HCC): ICD-10-CM

## 2023-06-01 DIAGNOSIS — Z12.12 ENCOUNTER FOR COLORECTAL CANCER SCREENING: ICD-10-CM

## 2023-06-01 DIAGNOSIS — Z12.5 SCREENING FOR PROSTATE CANCER: ICD-10-CM

## 2023-06-01 PROBLEM — R06.83 SNORING: Status: RESOLVED | Noted: 2021-12-13 | Resolved: 2023-06-01

## 2023-06-01 PROBLEM — R63.5 WEIGHT GAIN: Status: RESOLVED | Noted: 2021-12-13 | Resolved: 2023-06-01

## 2023-06-01 PROCEDURE — 3078F DIAST BP <80 MM HG: CPT | Performed by: FAMILY MEDICINE

## 2023-06-01 PROCEDURE — 99214 OFFICE O/P EST MOD 30 MIN: CPT | Performed by: FAMILY MEDICINE

## 2023-06-01 PROCEDURE — 3075F SYST BP GE 130 - 139MM HG: CPT | Performed by: FAMILY MEDICINE

## 2023-06-02 LAB
ALBUMIN/GLOBULIN RATIO: 2 (CALC) (ref 1–2.5)
ALBUMIN: 4.5 G/DL (ref 3.6–5.1)
ALKALINE PHOSPHATASE: 76 U/L (ref 35–144)
ALT: 50 U/L (ref 9–46)
AST: 45 U/L (ref 10–35)
BILIRUBIN, TOTAL: 0.6 MG/DL (ref 0.2–1.2)
BUN: 16 MG/DL (ref 7–25)
CALCIUM: 9.9 MG/DL (ref 8.6–10.3)
CARBON DIOXIDE: 27 MMOL/L (ref 20–32)
CHLORIDE: 104 MMOL/L (ref 98–110)
CREATININE: 0.79 MG/DL (ref 0.7–1.35)
EGFR: 102 ML/MIN/1.73M2
GLOBULIN: 2.3 G/DL (CALC) (ref 1.9–3.7)
GLUCOSE: 96 MG/DL (ref 65–99)
HEMATOCRIT: 44.2 % (ref 38.5–50)
HEMOGLOBIN: 15.1 G/DL (ref 13.2–17.1)
MCH: 30.3 PG (ref 27–33)
MCHC: 34.2 G/DL (ref 32–36)
MCV: 88.8 FL (ref 80–100)
MPV: 10.8 FL (ref 7.5–12.5)
PLATELET COUNT: 267 THOUSAND/UL (ref 140–400)
POTASSIUM: 4.7 MMOL/L (ref 3.5–5.3)
PROTEIN, TOTAL: 6.8 G/DL (ref 6.1–8.1)
PSA, TOTAL: 1.35 NG/ML
RDW: 12.4 % (ref 11–15)
RED BLOOD CELL COUNT: 4.98 MILLION/UL (ref 4.2–5.8)
SODIUM: 141 MMOL/L (ref 135–146)
TSH: 1.73 MIU/L (ref 0.4–4.5)
WHITE BLOOD CELL COUNT: 9.8 THOUSAND/UL (ref 3.8–10.8)

## 2023-07-11 NOTE — H&P
[facility] - Gastroenterology                                                                                                               Reason for consult: eval    Requesting physician or provider: Lima Cesar MD    Patient presents with:  Consult: Consult for colon screening      HPI:   Brian Carbone is a 59year old year-old male with history of gerd, obesity, hemorrhoids:    he is here today for evaluation  He describes chronic h/o gerd historically controlled on prilosec prn  He has had post-prandial burning type pain in epigastric area. Sx intermittent x last year. He has been taking prilosc daily x last 2 weeks and pain has improved. H/o gerd with late night eating. he denies dysphagia, odynophagia and/or globus. he denies abdominal pain. he denies nausea and/or vomiting. he denies recent change in appetite and/or unintentional weight loss. Has had early satiety. he moves his bowels daily and without recent change. He has occasional constipation and straining with associated brbpr. Sx infrequent. Has h/o fissure. Last had bleeding 3 weeks ago. He denies rectal pain. No melena. NSAIDS: no  Tobacco: pipe use occasional  Alcohol: not daily.   [email or web address] for a living  Has approx 2 drinks/week  Marijuana: no  Illicit drugs: no    Father had gastric ca at age 72  No fhx colon and/or esophageal ca    No history of adverse reaction to sedation  No NGOC  No anticoagulants  No pacemaker/defibrillator  No pain medications and/or sleep aides    Last colonoscopy/egd: 3/2013 w/ Dr. Warden Severs  Normal cln to TI  Focal peptic esophagitis  HH  Diminutive gastric polyps     Wt Readings from Last 6 Encounters:  07/17/23 : (!) 311 lb 9.6 oz (141.3 kg)  08/02/22 : 299 lb (135.6 kg)  03/16/22 : (!) 301 lb (136.5 kg)  12/13/21 : 296 lb (134.3 kg)  12/01/21 : 288 lb 6.4 oz (130.8 kg)  11/17/21 : 298 lb 9.6 oz (135.4 kg)       History, Medications, Allergies, ROS:      Past Medical History:   Diagnosis Date    Back pain     Bilateral lower extremity edema 2012    perpheral BLE edema    Esophageal reflux     medication, EGD 2013    Hemorrhoids 2001    colonoscopy, nl colonoscopy 2013    Obesity, unspecified     Snoring 12/13/2021    Weight gain 12/13/2021      Past Surgical History:   Procedure Laterality Date    COLONOSCOPY  2001    colonoscopy    COLONOSCOPY  3/22/2013    nl colonoscopy     LASIK  1999    \"lasix eye surgery\" per NG. [LATERALITY NOT STATED]    UPPER GI ENDOSCOPY,BIOPSY  3/22/2013    gastroesophageal reflux disease      Family Hx:   Family History   Problem Relation Age of Onset    Cancer Father         bladder    Diabetes Father         DM    Heart Disease Father         CAD    Cataracts Father         cataract surgery    Dementia Mother     Arthritis Mother         possible arthritis    Neurological Disorder Mother         possible Parkinson's    Diabetes Brother     Rash Brother         eczema      Social History:   Social History     Socioeconomic History    Marital status: Single   Tobacco Use    Smoking status: Never    Smokeless tobacco: Never   Vaping Use    Vaping Use: Never used   Substance and Sexual Activity    Alcohol use: Yes     Comment: beer & liquor, 5 drinks, once weekly    Drug use: Never   Other Topics Concern    Caffeine Concern Yes     Comment: Red Bull 5 hour energy drink, occasionally        Medications (Active prior to today's visit):  Current Outpatient Medications   Medication Sig Dispense Refill    PEG 3350-KCl-Na Bicarb-NaCl (TRILYTE) 420 g Oral Recon Soln Take prep as directed by gastro office. May substitute with Trilyte/generic equivalent if needed. 4000 mL 0    SIMVASTATIN 40 MG Oral Tab TAKE ONE TABLET BY MOUTH NIGHTLY 90 tablet 3    triamcinolone 0.1 % External Ointment Apply 1 Application topically 2 (two) times daily.  30 g 1    Cholecalciferol (VITAMIN D) 50 MCG (2000 UT) Oral Cap Take by mouth. Zinc Sulfate (ZINC 15 OR) Take by mouth. Acidophilus/Pectin Oral Cap Take 1 capsule by mouth daily. Coenzyme Q10 (COQ10) 100 MG Oral Cap Take by mouth. omeprazole (PRILOSEC) 20 MG Oral Capsule Delayed Release Take 1 capsule by mouth every morning. 90 capsule 3    omega-3 fatty acids (FISH OIL) 1000 MG Oral Cap Take  by mouth. Allergies:  No Known Allergies    ROS:   CONSTITUTIONAL: negative for fevers, chills, sweats and weight loss  EYES Negative for red eyes, yellow eyes, changes in vision  HEENT: Negative for dysphagia and hoarseness  RESPIRATORY: Negative for cough and shortness of breath  CARDIOVASCULAR: Negative for chest pain, palpitations  GASTROINTESTINAL: See HPI  GENITOURINARY: Negative for dysuria and frequency  MUSCULOSKELETAL: Negative for arthralgias and myalgias  NEUROLOGICAL: Negative for dizziness and headaches  BEHAVIOR/PSYCH: Negative for anxiety and poor appetite    PHYSICAL EXAM:   Blood pressure 126/78, pulse 73, height 5' 11\" (1.803 m), weight (!) 311 lb 9.6 oz (141.3 kg). GEN: WD/WN, NAD  HEENT: Supple symmetrical, trachea midline  CV: RRR, the extremities are warm and well perfused   LUNGS: No increased work of breathing  ABDOMEN: No scars, normal bowel sounds, soft, non-tender, non-distended no rebound or guarding, no masses, no hepatomegaly  MSK: No redness, no warmth, no swelling of joints  SKIN: No jaundice, no erythema, no rashes  HEMATOLOGIC: No bleeding, no bruising  NEURO: Alert and interactive, normal gait    Labs/Imaging/Procedures:   Cln/egd 3/2013:  IMPRESSION:  1. Normal screening colonoscopy to the terminal ileum. 2. Focal peptic esophagitis. 3. Hiatal hernia. 4. Diminutive gastric polyps likely of no clinical consequence. RECOMMENDATIONS:  1. Followup biopsy results. 2. Can consider a trial of a daily PPI and assess swallowing.   If   symptoms     continue, would recommend an esophagram with solid component. 3. Screening colonoscopy in 10 years unless any new symptoms or   signs are     noted. Liver biopsy 2/2021:  Final Diagnosis:      Liver; core biopsy:  Benign liver parenchyma mild steatohepatitis (grade 1), and focal perisinusoidal fibrosis (stage 1)  See comment     Comment: The liver parenchyma exhibits moderate macrovesicular and microvesicular steatosis, occupying approximately 40% of the hepatocytes. Portal tracts are readily identified and show no significant increase in inflammatory infiltrate. No interface hepatitis is noted. Scattered foci of lobular inflammation are seen. Bile ducts are readily identified and show no evidence of injury. No portal or lobular granuloma or bile duct dropout is identified. Rare focal ballooning degeneration of hepatocytes is identified. Cherry Log Mura PAS and PASD stains show no evidence of pathologic intracellular inclusions. Reticulin stain shows preserved trabecular architecture. Iron stain shows 1+ iron in hepatocytes. Trichrome stain focal areas of perisinusoidal fibrosis, no significant portal, periportal, or bridging fibrosis is identified. Clinically, anti-smooth muscle antibodies are negative, viral serologies are negative, alpha 1 antitrypsin levels are within the normal limits, and phenotyping shows M2S phenotype. Overall, the findings are consistent with mild steatohepatitis (grade 1), with focal perisinusoidal fibrosis (stage 1). Recent Results (from the past 4380 hour(s))   COMP METABOLIC PANEL (14)    Collection Time: 06/01/23 12:55 PM   Result Value Ref Range    GLUCOSE 96 65 - 99 mg/dL     Comment:               Fasting reference interval         UREA NITROGEN (BUN) 16 7 - 25 mg/dL    CREATININE 0.79 0.70 - 1.35 mg/dL    EGFR 102 > OR = 60 mL/min/1.73m2     Comment: The eGFR is based on the CKD-EPI 2021 equation.  To calculate   the new eGFR from a previous Creatinine or Cystatin C  result, go to [email or web address] org/professionals/  [email or web address]      BUN/CREATININE RATIO NOT APPLICABLE 6 - 22 (calc)    SODIUM 141 135 - 146 mmol/L    POTASSIUM 4.7 3.5 - 5.3 mmol/L    CHLORIDE 104 98 - 110 mmol/L    CARBON DIOXIDE 27 20 - 32 mmol/L    CALCIUM 9.9 8.6 - 10.3 mg/dL    PROTEIN, TOTAL 6.8 6.1 - 8.1 g/dL    ALBUMIN 4.5 3.6 - 5.1 g/dL    GLOBULIN 2.3 1.9 - 3.7 g/dL (calc)    ALBUMIN/GLOBULIN RATIO 2.0 1.0 - 2.5 (calc)    BILIRUBIN, TOTAL 0.6 0.2 - 1.2 mg/dL    ALKALINE PHOSPHATASE 76 35 - 144 U/L    AST 45 (H) 10 - 35 U/L    ALT 50 (H) 9 - 46 U/L     *Note: Due to a large number of results and/or encounters for the requested time period, some results have not been displayed. A complete set of results can be found in Results Review. .  ASSESSMENT/PLAN:   Rosemary Lee is a 59year old year-old male with history of gerd, obesity, hemorrhoids:    #hepatic steatosis  #elevated liver enzymes  H/o elevated liver tests /fatty liver; U/S elastography 6/25/20 consistent with fatty liver with moderate to severe fibrosis (F3), however biopsy suggests only stage 1 fibrosis. AFP normal 3/2022. Immune to HBV on lab testing. Not immune to Hep A. He received vaccination x 1 June '20. Plan for HAV Ab. Advise f/u with Dr. Marvin Azul. #crc screening  #constipation  #brbpr  Occasional constipation w/ straining and suspect reported bleeding likely hemorrhoidal.  CBC, tsh normal 6/2023. I advised starting fiber supplement. Last cln/egd 3/2013 w/ Dr. Lynn Jang. Normal cln to TI. Focal peptic esophagitis  HH. Diminutive gastric polyps  He is due for crc screening. Plan for cln. #gerd  #epigastric pain  #early statiety  #fhx gastric ca  Chronic gerd with recent c/o burning type post-prandial epigastric pain. Sx improved with daily use of PPI. Given description, seems reflux related. EGD 2013 c/w hh, focal peptic esophagitis, diminutive gastric polyps. Father had gastric ca.       -fiber supplement  -omeprazole daily  -reflux diet modification  -minimize alcohol  -lab testing  -healthy bmi  -low-fat diet  -monitor cholesterol and blood sugar with pcp  -follow-up with Hepatology (Dr. Mildred Mayer)  T. [phone]    -consider need for imaging pending procedure results and patient symptoms    1. Schedule colonoscopy/egd with KAYLEIGH thurman/ Dr. Miles Or or Dr. Harshal Carlin or Dr. Ramirez Portal: #gerd  #epigastric pain  #early statiety  #fhx gastric ca  #hepatic steatosis  #elevated liver enzymes  ]    2.  bowel prep from pharmacy (split trilyte)    3. Continue all medications prior to procedure    4. Read all bowel prep instructions carefully    5. AVOID seeds, nuts, popcorn, raw fruits and vegetables (cooked is okay) for 2-3 days before procedure    6. You MAY need to go for COVID testing 72 hours before procedure. The testing team will call you a few days before your procedure to discuss with you if testing is required. If you are asked to go for COVID testing and do not completed the test, the procedure cannot be performed. 7. If you start any NEW medication after your visit today, please notify us. Certain medications will need to be held before the procedure, or the procedure cannot be performed.     >>>Please note: if you were prescribed Suprep for the bowel prep and it is too expensive or not covered by insurance, it is okay to substitute Trilyte (or any similar generic prep). This can be done by notifying the pharmacy or calling our office. Orders This Visit:  Orders Placed This Encounter      Hep A AB, Total      Meds This Visit:  Requested Prescriptions     Signed Prescriptions Disp Refills    PEG 3350-KCl-Na Bicarb-NaCl (TRILYTE) 420 g Oral Recon Soln 4000 mL 0     Sig: Take prep as directed by gastro office. May substitute with Trilyte/generic equivalent if needed. Imaging & Referrals:  None    ENDOSCOPIC RISK BENEFIT DISCUSSION: I described the procedure in great detail with the patient.  I discussed the risks and benefits, including but not limited to: bleeding, perforation, infection, anesthesia complications, and even death. Patient will be NPO after midnight and will have a person physically present at time of pick-up to drive patient home. Patient verbalized understanding and agrees to proceed with procedure as planned. Jonas Perez. Roslyn Roberson, APRN   7/17/2023        This note was partially prepared using [employer] [facility] voice recognition dictation software. As a result, errors may occur. When identified, these errors have been corrected.  While every attempt is made to correct errors during dictation, discrepancies may still exist.

## 2023-07-17 ENCOUNTER — TELEPHONE (OUTPATIENT)
Facility: CLINIC | Age: 58
End: 2023-07-17

## 2023-07-17 ENCOUNTER — OFFICE VISIT (OUTPATIENT)
Facility: CLINIC | Age: 58
End: 2023-07-17

## 2023-07-17 VITALS
HEART RATE: 73 BPM | HEIGHT: 71 IN | BODY MASS INDEX: 43.63 KG/M2 | DIASTOLIC BLOOD PRESSURE: 78 MMHG | WEIGHT: 311.63 LBS | SYSTOLIC BLOOD PRESSURE: 126 MMHG

## 2023-07-17 DIAGNOSIS — K76.0 HEPATIC STEATOSIS: ICD-10-CM

## 2023-07-17 DIAGNOSIS — K21.9 GASTROESOPHAGEAL REFLUX DISEASE, UNSPECIFIED WHETHER ESOPHAGITIS PRESENT: ICD-10-CM

## 2023-07-17 DIAGNOSIS — K62.5 BRBPR (BRIGHT RED BLOOD PER RECTUM): ICD-10-CM

## 2023-07-17 DIAGNOSIS — R74.8 ELEVATED LIVER ENZYMES: ICD-10-CM

## 2023-07-17 DIAGNOSIS — R68.81 EARLY SATIETY: ICD-10-CM

## 2023-07-17 DIAGNOSIS — K76.0 HEPATIC STEATOSIS: Primary | ICD-10-CM

## 2023-07-17 DIAGNOSIS — Z80.0 FAMILY HISTORY OF GASTRIC CANCER: ICD-10-CM

## 2023-07-17 DIAGNOSIS — K21.9 GASTROESOPHAGEAL REFLUX DISEASE, UNSPECIFIED WHETHER ESOPHAGITIS PRESENT: Primary | ICD-10-CM

## 2023-07-17 DIAGNOSIS — R10.13 EPIGASTRIC PAIN: ICD-10-CM

## 2023-07-17 DIAGNOSIS — K59.00 CONSTIPATION, UNSPECIFIED CONSTIPATION TYPE: ICD-10-CM

## 2023-07-17 RX ORDER — POLYETHYLENE GLYCOL 3350, SODIUM CHLORIDE, SODIUM BICARBONATE, POTASSIUM CHLORIDE 420; 11.2; 5.72; 1.48 G/4L; G/4L; G/4L; G/4L
POWDER, FOR SOLUTION ORAL
Qty: 4000 ML | Refills: 0 | Status: SHIPPED | OUTPATIENT
Start: 2023-07-17

## 2023-07-17 NOTE — TELEPHONE ENCOUNTER
Scheduled for:  Colonoscopy 44406/99133, EGD [address]  Provider Name:  Dr. Yovani Medina  Date: 10/30/23   Location:  [facility]  Sedation:  [place]  Time:  10:00 am (pt is aware to arrive at 9:00 am)    Prep:  Trilyte  Meds/Allergies Reconciled?:  Physician reviewed      Diagnosis with codes: GERD k21.9,epigastric pain r10.13 early satiety r68.81, family history f gastric cancer Z80.0 hepatic steatosis k76.0 elevated liver enzymes r74.8  Was patient informed to call insurance with codes (Y/N):  yes     Referral sent?:  Referral was sent at the time of electronic surgical scheduling. [facility] or [address] notified?:  I sent an electronic request to Endo Scheduling and received a confirmation today. Medication Orders:  Pt is aware to NOT take iron pills, herbal meds and diet supplements for 7 days before exam. Also to NOT take any form of alcohol, recreational drugs and any forms of ED meds 24 hours before exam.     Misc Orders:       Further instructions given by staff:  I discussed prep instructions with the patient at the time of the appointment which he  verbally understood and given the prep instructions at the time of the appointment. Patient was informed about the new cancellation policy for his/her procedure. Patient was also given a copy of the cancellation policy at the time of the appointment and verbalized understanding.

## 2023-08-15 ENCOUNTER — APPOINTMENT (OUTPATIENT)
Dept: PHYSICAL THERAPY | Age: 58
End: 2023-08-15
Attending: FAMILY MEDICINE
Payer: COMMERCIAL

## 2023-08-16 ENCOUNTER — TELEPHONE (OUTPATIENT)
Dept: PHYSICAL THERAPY | Facility: HOSPITAL | Age: 58
End: 2023-08-16

## 2023-08-17 ENCOUNTER — OFFICE VISIT (OUTPATIENT)
Dept: PHYSICAL THERAPY | Age: 58
End: 2023-08-17
Attending: FAMILY MEDICINE
Payer: COMMERCIAL

## 2023-08-17 DIAGNOSIS — M17.0 OSTEOARTHRITIS OF BOTH KNEES, UNSPECIFIED OSTEOARTHRITIS TYPE: Primary | ICD-10-CM

## 2023-08-17 PROCEDURE — 97112 NEUROMUSCULAR REEDUCATION: CPT

## 2023-08-17 PROCEDURE — 97110 THERAPEUTIC EXERCISES: CPT

## 2023-08-17 PROCEDURE — 97161 PT EVAL LOW COMPLEX 20 MIN: CPT

## 2023-08-17 PROCEDURE — 97140 MANUAL THERAPY 1/> REGIONS: CPT

## 2023-08-18 ENCOUNTER — OFFICE VISIT (OUTPATIENT)
Dept: PULMONOLOGY | Facility: CLINIC | Age: 58
End: 2023-08-18

## 2023-08-18 VITALS
RESPIRATION RATE: 16 BRPM | DIASTOLIC BLOOD PRESSURE: 76 MMHG | SYSTOLIC BLOOD PRESSURE: 126 MMHG | OXYGEN SATURATION: 95 % | HEART RATE: 79 BPM

## 2023-08-18 DIAGNOSIS — G47.33 OSA (OBSTRUCTIVE SLEEP APNEA): Primary | ICD-10-CM

## 2023-08-18 PROCEDURE — 3074F SYST BP LT 130 MM HG: CPT | Performed by: PHYSICIAN ASSISTANT

## 2023-08-18 PROCEDURE — 3078F DIAST BP <80 MM HG: CPT | Performed by: PHYSICIAN ASSISTANT

## 2023-08-18 PROCEDURE — 99203 OFFICE O/P NEW LOW 30 MIN: CPT | Performed by: PHYSICIAN ASSISTANT

## 2023-08-18 NOTE — PROGRESS NOTES
Pulmonary Consult Note    History of Present Illness:  Reynolds Holstein is a 59year old male presenting to pulmonary clinic today for NGOC, referred by PCP Dr. Benedict Whitten. He was diagnosed with NGOC of unknown severity in 2019. He was never set up with CPAP. He uses a mandibular advancement device through his dentist. Initially he felt this helped. He admits to snoring. His sleep is unrefreshing most of the time and he has daytime sleepiness around noon. He does not take naps. There is no gasping for air. There is history of witnessed apneic events prior to initiation of mandibular advancement device. He goes to bed at midnight, falls asleep promptly, and awakens at 6 AM. He describes nocturia x2. He also has nocturnal awakenings due to his cats. There are occasional AM headaches. There is occasional drowsy driving and he specifically drinks caffeine prior to driving longer distances for work. He is currently at his heaviest weight. There is no urge to move the limbs. [place] Sleepiness Scale score is 11/24. Past Medical History: NGOC, hyperlipidemia, venous insufficiency, obesity, esophageal reflux, hepatic steatosis    Past Surgical History: None    Family Medical History: Mother  with dementia; father  with MI, DM, bladder cancer    Social History: Single, no kids, sales  -Tobacco: Occasional pipe (2x per year)  -Alcohol: Occasional    Allergies: Patient has no known allergies. Medications: has a current medication list which includes the following prescription(s): peg 3350-kcl-na bicarb-nacl, simvastatin, triamcinolone, vitamin d, zinc sulfate, acidophilus-pectin, coq10, omeprazole, and omega-3 fatty acids. Review of Systems: Vision normal. Ear nose and throat normal. Bowel normal. Bladder function notable for frequent nocturia. No depression. No thyroid disease. No rash. Muscles and joints normal. Weight gain of 20 pounds in the last year.      Physical Exam:  /76   Pulse 79   Resp 16 SpO2 95%    Constitutional: Obese. No acute distress. HEENT: Head NC/AT. PEERL. Crowded oropharynx. Mallampati class 4. Cardio: Regular rate and rhythm. Normal S1 and S2. No murmurs. Respiratory: Thorax symmetrical with no labored breathing. Clear to ausculation bilaterally with symmetrical breath sounds. No wheezing, rhonchi, or crackles. Extremities: No clubbing or cyanosis. +Bilateral LE edema. No calf tenderness. Neurologic: A&Ox3. No gross motor deficits. Skin: Warm, dry. Lymphatic: No cervical or supraclavicular lymphadenopathy. Psych: Pleasant affect. Cooperative. Results: None    Assessment/Plan:  NGOC - H/o NGOC of unknown severity, currently using mandibular advancement device with some improvement although has residual symptoms. Given weight gain and symptoms despite oral appliance, would benefit from repeat sleep study and likely CPAP initiation. Patient expresses hesitancy to pursue sleep study and CPAP. Explained will request prior sleep study records and call patient thereafter to finalize plan. In the meantime, he can schedule home sleep study and check with his insurance on coverage. Discussed risks of untreated NGOC including increased risk of cardiovascular and cerebrovascular events.    Plan:   -Facilitate prior sleep study records for review  -Home sleep study  -Anticipate eventual CPAP initiation  -Weight loss  -Avoid alcohol  -Avoid sedating drugs  -Never drive if sleepy  -Timing of follow-up contingent on above    Noe Rabago PA-C  Pulmonary Medicine  8/18/2023

## 2023-08-18 NOTE — PATIENT INSTRUCTIONS
We will request your prior sleep study records and I will give you a call after I review. Order for home sleep study. Please call [phone].

## 2023-08-18 NOTE — PROGRESS NOTES
Faxed CHRISTINA form with request for sleep study to [facility] at [phone]. Confirmation received and sent to scanning.

## 2023-08-21 ENCOUNTER — TELEPHONE (OUTPATIENT)
Dept: PULMONOLOGY | Facility: CLINIC | Age: 58
End: 2023-08-21

## 2023-08-21 NOTE — TELEPHONE ENCOUNTER
Received [employer] report dated 1/12/18. Report placed in [address] [place] [address] folder for review.

## 2023-08-29 ENCOUNTER — APPOINTMENT (OUTPATIENT)
Dept: PHYSICAL THERAPY | Age: 58
End: 2023-08-29
Attending: FAMILY MEDICINE
Payer: COMMERCIAL

## 2023-09-05 ENCOUNTER — TELEPHONE (OUTPATIENT)
Dept: PHYSICAL THERAPY | Age: 58
End: 2023-09-05

## 2023-09-05 ENCOUNTER — APPOINTMENT (OUTPATIENT)
Dept: PHYSICAL THERAPY | Age: 58
End: 2023-09-05
Attending: FAMILY MEDICINE
Payer: COMMERCIAL

## 2023-09-05 NOTE — TELEPHONE ENCOUNTER
Called patient re: no show to today's PT appointment. Patient stated that he thought that his appointment was at 3pm today. Advised that it was scheduled at 11am, unable to accommodate him at 3pm. Confirmed next PT appointment scheduled 9/7 at 12:30pm.  Pt confirmed that he will attend.

## 2023-09-07 ENCOUNTER — OFFICE VISIT (OUTPATIENT)
Dept: PHYSICAL THERAPY | Age: 58
End: 2023-09-07
Attending: FAMILY MEDICINE
Payer: COMMERCIAL

## 2023-09-07 PROCEDURE — 97110 THERAPEUTIC EXERCISES: CPT | Performed by: PHYSICAL THERAPIST

## 2023-09-07 PROCEDURE — 97530 THERAPEUTIC ACTIVITIES: CPT | Performed by: PHYSICAL THERAPIST

## 2023-09-07 NOTE — PROGRESS NOTES
Diagnosis:   Osteoarthritis of both knees, unspecified osteoarthritis type (M17.0)    Referring Provider: Pooja Foy  Date of Evaluation:    8/17/2023    Precautions:   venous insufficiency and associated LE edema Next MD visit:   none scheduled  Date of Surgery: n/a   Insurance Primary/Secondary: [employer] / N/A     # Auth Visits: No auth/no limit            Subjective: C/o heel pain when I walk a lot, think I have plantar fasciitis. C/o B knee pain. To see vascular surgeon re: LE edema related to vein issues. 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 Using compressions socks for LE edema. C/o pain with ingrown toenail; to see podiatrist tomorrow. Pain: ~2/10      Objective:  Knee AROM: R - 6 - 112 deg; L - 5 - 115 deg  Hypomobile R patella sup/inf and med/lat glides      Assessment: Pt presents with c/o heel/Meaghan's and knee pain related to possible plantar fasciitis, knee OA. Added gastroc/plantar fascia stretches to Hep. Reviewed considerations for knee OA; see below. Majority of session spent on TA/patient education; see below. Advised pt may also consider backpack vs unilateral bag due to c/o LBP with unilateral bag, but pt concerned re: heat/sweating for using  backback. Pt tolerated session well.         Goals:   (to be met in 12 visits)  Pt will improve knee extension ROM to   0 deg to allow proper heel strike during gait and terminal knee extension in stance   Pt will improve knee AROM flexion to equal opposite side in degrees to improve ability to perform squat, stairs, other functional ADLs   Pt will improve quad strength to 5/5 to ascend 1 flight of stairs reciprocally without UE assist   Pt will increase hip and knee strength to grossly 4+/5 to be able to get up and down from the floor safely   Pt will demonstrate increased hip ER/ABD strength to 5/5 to perform stepping and squatting activities without excessive femoral IR/ADD   Pt will improve SLS to equal opposite side to improve safety with gait on uneven surfaces such as grass and gravel  Pt will be independent and compliant with comprehensive HEP to maintain progress achieved in PT   Current LEFS 44% Goal 20%      Plan: Continue PT 2 x weekly for up to 12 visits. F/u on heel/foot pain in response to gastroc/plantar fascia stretches. Progress knee ROM, exercise regimen. Review exercise considerations for OA. Date: 9/7/2023  TX#: 2/12 Date:                 TX#: 3/ Date:                 TX#: 4/ Date:                 TX#: 5/ Date: Tx#: 6/   There Ex:   Standing L/R gastroc stretch x 20 sec each (HEP)  Plantar fascia stretch on bottom step of staircase x 30 sec each (HEP)  Supine B knee ext - flexion AAROM with LE supported on ball 5 x 5 sec each  Stationary bike: level 1 x 3 min         Manual:   R patella sup/inf and med/lat glides       There Activities:   Re: c/o LE edema: pt may consider compressions socks, LE elevation, light/appropriate exercise; pt reports scheduled for vascular surgeon consultation. Re; considerations for knee OA: consider pool exercise (walking, light squats, etc), stationary bike, limit WB exercise to as well sasha, knee braces, footwear; MHP/cold pack  Re: pt c/o pain with ingrown toenail and footwear considerations: advised DPM consultation as scheduled tomorrow; may temporarily consider cold pack application, avoid tight shoes for ingrown toenail for pain management  Re: plantar fascia c/o: consider gastroc/plantar fascia stretches, massage plantar fascia, orthotics per DPM               HEP: gastroc and plantar fascia stretches. Consider pool/stationary bike. MHP/cold pack for knee OA.      Charges: 1 TE (10 min), 2 TA (30 min), 0 manual (2 min)   Total Timed Treatment: 42 min  Total Treatment Time: 52 min

## 2023-09-12 ENCOUNTER — APPOINTMENT (OUTPATIENT)
Dept: PHYSICAL THERAPY | Age: 58
End: 2023-09-12
Attending: FAMILY MEDICINE
Payer: COMMERCIAL

## 2023-09-19 ENCOUNTER — APPOINTMENT (OUTPATIENT)
Dept: PHYSICAL THERAPY | Age: 58
End: 2023-09-19
Attending: FAMILY MEDICINE
Payer: COMMERCIAL

## 2023-09-28 ENCOUNTER — LAB ENCOUNTER (OUTPATIENT)
Dept: LAB | Age: 58
End: 2023-09-28
Attending: FAMILY MEDICINE
Payer: COMMERCIAL

## 2023-09-28 ENCOUNTER — OFFICE VISIT (OUTPATIENT)
Dept: FAMILY MEDICINE CLINIC | Facility: CLINIC | Age: 58
End: 2023-09-28

## 2023-09-28 ENCOUNTER — OFFICE VISIT (OUTPATIENT)
Dept: PHYSICAL THERAPY | Age: 58
End: 2023-09-28
Attending: FAMILY MEDICINE
Payer: COMMERCIAL

## 2023-09-28 VITALS
HEIGHT: 70.87 IN | BODY MASS INDEX: 42.73 KG/M2 | HEART RATE: 75 BPM | TEMPERATURE: 97 F | DIASTOLIC BLOOD PRESSURE: 77 MMHG | SYSTOLIC BLOOD PRESSURE: 126 MMHG | WEIGHT: 305.25 LBS

## 2023-09-28 DIAGNOSIS — K76.0 HEPATIC STEATOSIS: ICD-10-CM

## 2023-09-28 DIAGNOSIS — K74.00 HEPATIC FIBROSIS: ICD-10-CM

## 2023-09-28 DIAGNOSIS — E78.00 HYPERCHOLESTEROLEMIA: ICD-10-CM

## 2023-09-28 DIAGNOSIS — G47.33 OSA (OBSTRUCTIVE SLEEP APNEA): ICD-10-CM

## 2023-09-28 DIAGNOSIS — N40.1 BENIGN PROSTATIC HYPERPLASIA WITH URINARY FREQUENCY: ICD-10-CM

## 2023-09-28 DIAGNOSIS — R35.0 BENIGN PROSTATIC HYPERPLASIA WITH URINARY FREQUENCY: ICD-10-CM

## 2023-09-28 DIAGNOSIS — M15.9 GENERALIZED OSTEOARTHROSIS, INVOLVING MULTIPLE SITES: ICD-10-CM

## 2023-09-28 DIAGNOSIS — M79.671 PAIN IN BOTH FEET: ICD-10-CM

## 2023-09-28 DIAGNOSIS — M79.672 PAIN IN BOTH FEET: ICD-10-CM

## 2023-09-28 DIAGNOSIS — Z00.00 ENCOUNTER FOR ANNUAL PHYSICAL EXAM: Primary | ICD-10-CM

## 2023-09-28 DIAGNOSIS — E66.01 MORBID OBESITY WITH BMI OF 40.0-44.9, ADULT (HCC): ICD-10-CM

## 2023-09-28 PROCEDURE — 97140 MANUAL THERAPY 1/> REGIONS: CPT | Performed by: PHYSICAL THERAPIST

## 2023-09-28 PROCEDURE — 97110 THERAPEUTIC EXERCISES: CPT | Performed by: PHYSICAL THERAPIST

## 2023-09-28 PROCEDURE — 90686 IIV4 VACC NO PRSV 0.5 ML IM: CPT | Performed by: FAMILY MEDICINE

## 2023-09-28 PROCEDURE — 3078F DIAST BP <80 MM HG: CPT | Performed by: FAMILY MEDICINE

## 2023-09-28 PROCEDURE — 97530 THERAPEUTIC ACTIVITIES: CPT | Performed by: PHYSICAL THERAPIST

## 2023-09-28 PROCEDURE — 99396 PREV VISIT EST AGE 40-64: CPT | Performed by: FAMILY MEDICINE

## 2023-09-28 PROCEDURE — 3008F BODY MASS INDEX DOCD: CPT | Performed by: FAMILY MEDICINE

## 2023-09-28 PROCEDURE — 90471 IMMUNIZATION ADMIN: CPT | Performed by: FAMILY MEDICINE

## 2023-09-28 PROCEDURE — 3074F SYST BP LT 130 MM HG: CPT | Performed by: FAMILY MEDICINE

## 2023-09-28 RX ORDER — TAMSULOSIN HYDROCHLORIDE 0.4 MG/1
0.4 CAPSULE ORAL DAILY
Qty: 90 CAPSULE | Refills: 3 | Status: SHIPPED | OUTPATIENT
Start: 2023-09-28 | End: 2024-09-27

## 2023-09-28 NOTE — PROGRESS NOTES
Diagnosis:   Osteoarthritis of both knees, unspecified osteoarthritis type (M17.0)    Referring Provider: Tamela Sparks MD Date of Evaluation:    8/17/2023    Precautions:   venous insufficiency and associated LE edema Next MD visit:   none scheduled  Date of Surgery: n/a   Insurance Primary/Secondary: [employer] INS / N/A     # Auth Visits: No auth/no limit            Subjective: Plantar fascia stretches help. Saw podiatrist: in grown toe nail taken care of. Had B knee injections in July 2023 which has helped. Walked a lot on uneven at [employer], other [employer] - c/o increased B LE pain, calf cramps even with stretches. I would love to work on plantar fasciitis today during PT session. C/o LBP if have to  one place for 15-20 min, have to use a SPC at times, with computer bag use. Pain: ~2/10      Objective:  Knee AROM: R - 6 - 112 deg; L - 5 - 115 deg (per previous session)  Hypomobile: R > L patella sup/inf and med/lat glides  Palpation: + c/o L/R medial/central calcaneus      Assessment:   Patient returns to PT after last attending 9/7/2023. Majority of session spent on addressing pt's questions/inquiries; see below. Patient responding to plantar fascia stretches, reporting less c/o. Working on knee ROM, patella mobilization, knee strengthening to address knee OA. Pt tolerated session well, reported helpful.      Goals:   (to be met in 12 visits)  Pt will improve knee extension ROM to   0 deg to allow proper heel strike during gait and terminal knee extension in stance   Pt will improve knee AROM flexion to equal opposite side in degrees to improve ability to perform squat, stairs, other functional ADLs   Pt will improve quad strength to 5/5 to ascend 1 flight of stairs reciprocally without UE assist   Pt will increase hip and knee strength to grossly 4+/5 to be able to get up and down from the floor safely   Pt will demonstrate increased hip ER/ABD strength to 5/5 to perform stepping and squatting activities without excessive femoral IR/ADD   Pt will improve SLS to equal opposite side to improve safety with gait on uneven surfaces such as grass and gravel  Pt will be independent and compliant with comprehensive HEP to maintain progress achieved in PT   Current LEFS 44% Goal 20%      Plan: Continue PT 2 x weekly for up to 12 visits. F/u on plantar fascia and knee c/o. Progress knee ROM, exercise regimen. Review additional knee strengthenig considerations for OA, per pt inquiry. Date: 9/7/2023  TX#: 2/12 Date: 9/28/2023                TX#: 3/12 Date:                 TX#: 4/ Date:                 TX#: 5/ Date: Tx#: 6/   There Ex:   Standing L/R gastroc stretch x 20 sec each (HEP)  Plantar fascia stretch on bottom step of staircase x 30 sec each (HEP)  Supine B knee ext - flexion AAROM with LE supported on ball 5 x 5 sec each  Stationary bike: level 1 x 3 min There Ex:   Stationary bike: level 1 x 2 min  L/R plantar fascia stretch x 30 sec each  Supine DKC with LE supported on ball 5 x 5 sec each  Bridge x 10 reps (HEP)        Manual:   R patella sup/inf and med/lat glides Manual:   R patella sup/inf and med/lat glides  STM B plantar fascia      There Activities:   Re: c/o LE edema: pt may consider compressions socks, LE elevation, light/appropriate exercise; pt reports scheduled for vascular surgeon consultation.   Re; considerations for knee OA: consider pool exercise (walking, light squats, etc), stationary bike, limit WB exercise to as well sasha, knee braces, footwear; MHP/cold pack  Re: pt c/o pain with ingrown toenail and footwear considerations: advised DPM consultation as scheduled tomorrow; may temporarily consider cold pack application, avoid tight shoes for ingrown toenail for pain management  Re: plantar fascia c/o: consider gastroc/plantar fascia stretches, massage plantar fascia, orthotics per DPM  There Activities:   Reviewed consideration of pool, stationary bike for knee OA/LE edema, leg press for strengthening  Considerations for plantar fasciitis: gastroc/plantar fascia stretches mornings when get out of bed; heel/toe raises, stretches when sitting   Edema management: compression socks (pt reports has custom compression socks, too intense, wear OTC now), elevation, muscle pump mechanisim for edema reduction  Advised pt may consider IFC/TENs unit for knee OA if helpful   Pt reports using SPC at times for knee OA; pt edu re: SPC - if helpful, advised appropriate height, may consider knee braces with patella aperture             HEP: gastroc and plantar fascia stretches, seated heel/toe raises; bridge. Consider pool/stationary bike. MHP/cold pack for knee OA.      Charges: 1 TE (10 min), 2 TA (25 min), 1 manual (10 min)   Total Timed Treatment: 45 min  Total Treatment Time: 50 min

## 2023-09-28 NOTE — PROGRESS NOTES
Subjective:   Patient ID: Shani Wolfe is a 59year old male. Patient presents for routine physical.        History/Other:   Review of Systems   Constitutional: Negative. Respiratory: Negative. Cardiovascular: Negative. Gastrointestinal: Negative. Genitourinary:  Positive for frequency. Musculoskeletal:  Positive for arthralgias and gait problem. Skin: Negative. Current Outpatient Medications   Medication Sig Dispense Refill    tamsulosin (FLOMAX) 0.4 MG Oral Cap Take 1 capsule (0.4 mg total) by mouth daily. 90 capsule 3    SIMVASTATIN 40 MG Oral Tab TAKE ONE TABLET BY MOUTH NIGHTLY 90 tablet 3    triamcinolone 0.1 % External Ointment Apply 1 Application topically 2 (two) times daily. 30 g 1    Cholecalciferol (VITAMIN D) 50 MCG (2000 UT) Oral Cap Take by mouth. Zinc Sulfate (ZINC 15 OR) Take by mouth. Coenzyme Q10 (COQ10) 100 MG Oral Cap Take by mouth. omeprazole (PRILOSEC) 20 MG Oral Capsule Delayed Release Take 1 capsule by mouth every morning. 90 capsule 3    omega-3 fatty acids (FISH OIL) 1000 MG Oral Cap Take  by mouth. Allergies:No Known Allergies    Objective:   Physical Exam  Constitutional:       Appearance: Normal appearance. Cardiovascular:      Rate and Rhythm: Normal rate and regular rhythm. Heart sounds: Normal heart sounds. Pulmonary:      Effort: Pulmonary effort is normal.      Breath sounds: Normal breath sounds. Abdominal:      Palpations: Abdomen is soft. There is no mass. Tenderness: There is no abdominal tenderness. Musculoskeletal:      Comments: Ankles 1+ nonpitting edema   Lymphadenopathy:      Cervical: No cervical adenopathy. Skin:     General: Skin is warm and dry. Neurological:      Mental Status: He is alert and oriented to person, place, and time. Assessment & Plan:   Encounter for annual physical exam  (primary encounter diagnosis)-patient is single. Continues to work in [employer] industry.   Encourage regular exercise  Check lipid profile today, reviewed labs from earlier this year. Flu vaccine given    Hypercholesterolemia-continuing simvastatin. Morbid obesity with BMI of 40.0-44.9, adult (HCC)-weight has decreased from last check. Encourage slow but steady weight loss diet, regular exercise. Briefly discussed GLP-1 agonist pros and cons. Hepatic steatosis-followed by Dr. Joey Laurent. Check hepatitis A serology. Every 6 month AFP. Since last visit saw MARIE Leong. Has upcoming endoscopy scheduled. Generalized osteoarthrosis, involving multiple sites-has had hyaluronic acid injections. Interested in possible PRP. Likely will schedule with [facility]. Benign prostatic hyperplasia with urinary frequency-nocturia x1 or 2, but significant daytime frequency every 2 hours. Family history prostate cancer, recent PSA normal.  Plan trial of Flomax reviewed instructions and side effects of medication. If not better plan to try Myrbetriq, Pelvic floor therapy. Pain in both feet-history of planter fasciitis. Just started physical therapy. Also recently saw podiatry and had ingrown toenail procedure which was successful. NGOC (obstructive sleep apnea)-saw MARIE Guaman pulmonary. Reminded him to provide her with previous sleep studies. She is recommending  repeat sleep study, I suggested he inquire whether this would be most appropriate to be done with his mandibular advancement device. Orders Placed This Encounter      Lipid Panel      Fluzone Quadrivalent 6mo+ 0.5mL      Meds This Visit:  Requested Prescriptions     Signed Prescriptions Disp Refills    tamsulosin (FLOMAX) 0.4 MG Oral Cap 90 capsule 3     Sig: Take 1 capsule (0.4 mg total) by mouth daily.        Imaging & Referrals:  INFLUENZA VACCINE, QUAD, PRESERVATIVE FREE, 0.5 ML

## 2023-09-29 LAB
ALPHA FETOPROTEIN,$TUMOR MARKER: 2.9 NG/ML
CHOL/HDLC RATIO: 3.7 (CALC)
CHOLESTEROL, TOTAL: 174 MG/DL
HDL CHOLESTEROL: 47 MG/DL
LDL-CHOLESTEROL: 98 MG/DL (CALC)
NON-HDL CHOLESTEROL: 127 MG/DL (CALC)
TRIGLYCERIDES: 193 MG/DL

## 2023-10-05 ENCOUNTER — OFFICE VISIT (OUTPATIENT)
Dept: PHYSICAL THERAPY | Age: 58
End: 2023-10-05
Attending: FAMILY MEDICINE
Payer: COMMERCIAL

## 2023-10-05 PROCEDURE — 97530 THERAPEUTIC ACTIVITIES: CPT | Performed by: PHYSICAL THERAPIST

## 2023-10-05 PROCEDURE — 97110 THERAPEUTIC EXERCISES: CPT | Performed by: PHYSICAL THERAPIST

## 2023-10-05 NOTE — PROGRESS NOTES
Diagnosis:   Osteoarthritis of both knees, unspecified osteoarthritis type (M17.0)    Referring Provider: Flex Lora MD Date of Evaluation:    8/17/2023    Precautions:   venous insufficiency and associated LE edema Next MD visit:   none scheduled  Date of Surgery: n/a   Insurance Primary/Secondary: [employer] INS / N/A     # Auth Visits: No auth/no limit            Subjective: C/o increased knee pain x 1.5 days after last PT session. C/o 1 episode of L groin pain, have had history of this, has resolved. Dr. Flex Lora referred me to [facility] for knee OA, possible knee replacements. Had B knee injections in July 2023 which has helped. Plantar fasciitis is much better. Pain:  0/10      Objective:  Knee AROM: R - 6 - 112 deg; L - 5 - 115 deg (per previous session)  Palpation: + c/o L/R medial/central calcaneus  B distal/foot edema (pt reports PCP aware)      Assessment:  Modified session based on Subjective; pt tolerated well. Pt with continued c/o knee pain. Reviewed considerations (see below) and added heel slides to HEP (ROM). Pt responding well to limited PT education/interventions for plantar fasciitis -  stretches, STM, footwear for plantar fasciitis c/o, reporting less symptoms. Pt may benefit from additional PT sessions to address residual knee ROM deficits, patella hypomobility, LE weakness related to knee OA. Pt declined IFC for knee c/o, reports had in past, not helpful.      Goals:   (to be met in 12 visits)  Pt will improve knee extension ROM to   0 deg to allow proper heel strike during gait and terminal knee extension in stance   Pt will improve knee AROM flexion to equal opposite side in degrees to improve ability to perform squat, stairs, other functional ADLs   Pt will improve quad strength to 5/5 to ascend 1 flight of stairs reciprocally without UE assist   Pt will increase hip and knee strength to grossly 4+/5 to be able to get up and down from the floor safely   Pt will demonstrate increased hip ER/ABD strength to 5/5 to perform stepping and squatting activities without excessive femoral IR/ADD   Pt will improve SLS to equal opposite side to improve safety with gait on uneven surfaces such as grass and gravel  Pt will be independent and compliant with comprehensive HEP to maintain progress achieved in PT   Current LEFS 44% Goal 20%      Plan: Continue PT 2 x weekly for up to 12 visits. F/u on plantar fascia and knee c/o, heel slides as part of HEP. Progress knee ROM, exercise regimen. Review additional knee strengthenig considerations for OA, per pt inquiry. Date: 9/7/2023  TX#: 2/12 Date: 9/28/2023                TX#: 3/12 Date: 10/5/2023                 TX#: 4/12 Date:                 TX#: 5/ Date: Tx#: 6/ There Ex:   Standing L/R gastroc stretch x 20 sec each (HEP)  Plantar fascia stretch on bottom step of staircase x 30 sec each (HEP)  Supine B knee ext - flexion AAROM with LE supported on ball 5 x 5 sec each  Stationary bike: level 1 x 3 min There Ex:   Stationary bike: level 1 x 2 min  L/R plantar fascia stretch x 30 sec each  Supine DKC with LE supported on ball 5 x 5 sec each  Bridge x 10 reps (HEP)   There Ex:   Stationary bike: level 1 x 3 min  L/R plantar fascia stretch x 30 sec  Standing L/R gastroc stretch x 20 sec each   Lateral steps at bar with RTB around ankles  4 x ~ 16 feet - too easy  Seated L knee ext- flexion AROM/heel slide 5 x 5 sec each (HEP)  Reformer: B squats x 10 reps all springs; L/R unilateral leg press  2 x 10 reps each all springs; B heel raises x 10 reps all springs     Manual:   R patella sup/inf and med/lat glides Manual:   R patella sup/inf and med/lat glides  STM B plantar fascia Manual:   STM B plantar fascia     There Activities:   Re: c/o LE edema: pt may consider compressions socks, LE elevation, light/appropriate exercise; pt reports scheduled for vascular surgeon consultation.   Re; considerations for knee OA: consider pool exercise (walking, light squats, etc), stationary bike, limit WB exercise to as well sasha, knee braces, footwear; MHP/cold pack  Re: pt c/o pain with ingrown toenail and footwear considerations: advised DPM consultation as scheduled tomorrow; may temporarily consider cold pack application, avoid tight shoes for ingrown toenail for pain management  Re: plantar fascia c/o: consider gastroc/plantar fascia stretches, massage plantar fascia, orthotics per DPM  There Activities:   Reviewed consideration of pool, stationary bike for knee OA/LE edema, leg press for strengthening  Considerations for plantar fasciitis: gastroc/plantar fascia stretches mornings when get out of bed; heel/toe raises, stretches when sitting   Edema management: compression socks (pt reports has custom compression socks, too intense, wear OTC now), elevation, muscle pump mechanisim for edema reduction  Advised pt may consider IFC/TENs unit for knee OA if helpful   Pt reports using SPC at times for knee OA; pt edu re: SPC - if helpful, advised appropriate height, may consider knee braces with patella aperture TA:   May consider plantar fasciitis night splint, although c/o seem to be managed by current regiments (stretches, footwear, STM)  Reviewed considerations for knee OA: knee brace, footwear, heat/cold, swim/pool, bike            HEP: gastroc and plantar fascia stretches, seated heel/toe raises; bridge; supine/seated heel slides/knee flex-ext AROM. Consider pool/stationary bike. MHP/cold pack for knee OA.      Charges: 2 TE (25 min), 1 TA (10 min), 0 manual (5 min)   Total Timed Treatment: 40 min  Total Treatment Time: 45 min

## 2023-10-11 ENCOUNTER — OFFICE VISIT (OUTPATIENT)
Dept: PHYSICAL THERAPY | Age: 58
End: 2023-10-11
Attending: FAMILY MEDICINE
Payer: COMMERCIAL

## 2023-10-11 PROCEDURE — 97530 THERAPEUTIC ACTIVITIES: CPT | Performed by: PHYSICAL THERAPIST

## 2023-10-11 PROCEDURE — 97110 THERAPEUTIC EXERCISES: CPT | Performed by: PHYSICAL THERAPIST

## 2023-10-11 NOTE — PROGRESS NOTES
Diagnosis:   Osteoarthritis of both knees, unspecified osteoarthritis type (M17.0)    Referring Provider: Nadege Guzman MD Date of Evaluation:    8/17/2023    Precautions:   venous insufficiency and associated LE edema Next MD visit:   none scheduled  Date of Surgery: n/a   Insurance Primary/Secondary: [employer]ella [employer]unt INS / N/A     # Auth Visits: No auth/no limit            Subjective: C/o knee discomfort with standing, 1 episode of ankle burning (may have been related to shoes). Have consultation with [facility] specialist for my knees tomorrow. Plantar fasciitis is better, deny c/o. Pain:  0/10 at rest      Objective:  Restrictions L/R knee AROM flex/ext  Palpation: min c/o L/R medial/central calcaneus  B distal/foot edema (pt reports PCP aware)    Assessment:  Pt with continued c/o knee pain related to OA. Continued to review PT considerations for knee c/o, edema management, exercise considerations. Pt has responded well to plantar fasciitis interventions, reporting min c/o, improvement. Pt may benefit from additional PT sessions to address residual knee ROM deficits, patella hypomobility, LE weakness related to knee OA. Pt declined IFC for knee c/o, reports had in past, not helpful.      Goals:   (to be met in 12 visits)  Pt will improve knee extension ROM to   0 deg to allow proper heel strike during gait and terminal knee extension in stance   Pt will improve knee AROM flexion to equal opposite side in degrees to improve ability to perform squat, stairs, other functional ADLs   Pt will improve quad strength to 5/5 to ascend 1 flight of stairs reciprocally without UE assist   Pt will increase hip and knee strength to grossly 4+/5 to be able to get up and down from the floor safely   Pt will demonstrate increased hip ER/ABD strength to 5/5 to perform stepping and squatting activities without excessive femoral IR/ADD   Pt will improve SLS to equal opposite side to improve safety with gait on uneven surfaces such as grass and merrick  Pt will be independent and compliant with comprehensive HEP to maintain progress achieved in PT   Current LEFS 44% Goal 20%      Plan: Continue PT 2 x weekly for up to 12 visits. Consider adding LAQ to HEP. Progress knee ROM, exercise regimen. Date: 9/7/2023  TX#: 2/12 Date: 9/28/2023                TX#: 3/12 Date: 10/5/2023                 TX#: 4/12 Date:  10/11/2023               TX#: 5/12 Date:    Tx#: 6/   There Ex:   Standing L/R gastroc stretch x 20 sec each (HEP)  Plantar fascia stretch on bottom step of staircase x 30 sec each (HEP)  Supine B knee ext - flexion AAROM with LE supported on ball 5 x 5 sec each  Stationary bike: level 1 x 3 min There Ex:   Stationary bike: level 1 x 2 min  L/R plantar fascia stretch x 30 sec each  Supine DKC with LE supported on ball 5 x 5 sec each  Bridge x 10 reps (HEP)   There Ex:   Stationary bike: level 1 x 3 min  L/R plantar fascia stretch x 30 sec  Standing L/R gastroc stretch x 20 sec each   Lateral steps at bar with RTB around ankles  4 x ~ 16 feet - too easy  Seated L knee ext- flexion AROM/heel slide 5 x 5 sec each (HEP)  Reformer: B squats x 10 reps all springs; L/R unilateral leg press  2 x 10 reps each all springs; B heel raises x 10 reps all springs There Ex:   Stationary bike: level 1 x 3 min  Supine L/R Knee Flexion AAROM with LE supported on ball 5 x 15 sec each  Supine L/R knee AAROM ext with LE supported on ball, gentle PT overpressure 5 x 15 sec each  Reformer: B squats x 10 reps all springs; L/R unilateral leg press  2 x 10 reps each all springs  Standing L/R hip flexor/gastroc stretch using staircase x 30 sec each  Lateral steps with GTB at ankles 3 x ~ 16 feet    Manual:   R patella sup/inf and med/lat glides Manual:   R patella sup/inf and med/lat glides  STM B plantar fascia Manual:   STM B plantar fascia Manual:   [facility] B plantar fascia  L/R patella mobilization sup/inf and med/lat    There Activities:   Re: c/o LE edema: pt may consider compressions socks, LE elevation, light/appropriate exercise; pt reports scheduled for vascular surgeon consultation. Re; considerations for knee OA: consider pool exercise (walking, light squats, etc), stationary bike, limit WB exercise to as well sasha, knee braces, footwear; MHP/cold pack  Re: pt c/o pain with ingrown toenail and footwear considerations: advised DPM consultation as scheduled tomorrow; may temporarily consider cold pack application, avoid tight shoes for ingrown toenail for pain management  Re: plantar fascia c/o: consider gastroc/plantar fascia stretches, massage plantar fascia, orthotics per DPM  There Activities:   Reviewed consideration of pool, stationary bike for knee OA/LE edema, leg press for strengthening  Considerations for plantar fasciitis: gastroc/plantar fascia stretches mornings when get out of bed; heel/toe raises, stretches when sitting   Edema management: compression socks (pt reports has custom compression socks, too intense, wear OTC now), elevation, muscle pump mechanisim for edema reduction  Advised pt may consider IFC/TENs unit for knee OA if helpful   Pt reports using SPC at times for knee OA; pt edu re: SPC - if helpful, advised appropriate height, may consider knee braces with patella aperture TA:   May consider plantar fasciitis night splint, although c/o seem to be managed by current regiments (stretches, footwear, STM)  Reviewed considerations for knee OA: knee brace, footwear, heat/cold, swim/pool, bike TA:   Pt education/review re: edema management: compression socks, LE elevation (pt may consider LE up/on wall)  Re: knee OA, balance activity with rest; consider biking, pool           HEP: gastroc and plantar fascia stretches, seated heel/toe raises; bridge; supine/seated heel slides/knee flex-ext AROM. Leg press: unilateral/bilateral. Consider pool/stationary bike. MHP/cold pack for knee OA.      Charges: 2 TE (25 min), 1 TA (10 min), 0 manual (5 min)   Total Timed Treatment: 40 min  Total Treatment Time: 45 min

## 2023-10-12 ENCOUNTER — TELEPHONE (OUTPATIENT)
Dept: FAMILY MEDICINE CLINIC | Facility: CLINIC | Age: 58
End: 2023-10-12

## 2023-10-18 ENCOUNTER — OFFICE VISIT (OUTPATIENT)
Dept: PHYSICAL THERAPY | Age: 58
End: 2023-10-18
Attending: FAMILY MEDICINE
Payer: COMMERCIAL

## 2023-10-18 PROCEDURE — 97110 THERAPEUTIC EXERCISES: CPT | Performed by: PHYSICAL THERAPIST

## 2023-10-18 NOTE — PROGRESS NOTES
Diagnosis:   Osteoarthritis of both knees, unspecified osteoarthritis type (M17.0)    Referring Provider: Dalila Almaguer MD Date of Evaluation:    8/17/2023    Precautions:   venous insufficiency and associated LE edema Next MD visit:   none scheduled  Date of Surgery: n/a   Insurance Primary/Secondary: [employer] INS / N/A     # Auth Visits: No auth/no limit            Subjective:  My feet are better, plantar fasciitis is now nearly non existent. Knees are better since started PT.  LE feel tired when I walk. Still have twinges in my knees; I don't think this will ever go away. Had to postpone [facility] appointment to next week due to no referral from Dr. Dalila Almaguer. Just booked a trip to [place]; expect to do a lot of walking. C/o feel like I am walking on pads on my feet. To have veins evaluated as contributing factor to ankle swelling. Pain:  0/10 at rest      Objective:  Restrictions L/R knee AROM flex/ext  B distal/foot edema (pt reports PCP aware)  Sensation intact to light touch L/R ventral foot    Assessment:  Patient reporting progress; see subjective. Pt's c/o feels like \"walking on pads\" may be related to LE/foot edema. He denies hx of DM or LE neuropathy. Advised physician f/u/consultation re: these c/o. Continue to review considerations for knee OA; see below. Pt may prefer to continue with PT HEP after 1 more session.      Goals:   (to be met in 12 visits)  Pt will improve knee extension ROM to   0 deg to allow proper heel strike during gait and terminal knee extension in stance   Pt will improve knee AROM flexion to equal opposite side in degrees to improve ability to perform squat, stairs, other functional ADLs   Pt will improve quad strength to 5/5 to ascend 1 flight of stairs reciprocally without UE assist   Pt will increase hip and knee strength to grossly 4+/5 to be able to get up and down from the floor safely   Pt will demonstrate increased hip ER/ABD strength to 5/5 to perform stepping and squatting activities without excessive femoral IR/ADD   Pt will improve SLS to equal opposite side to improve safety with gait on uneven surfaces such as grass and gravel  Pt will be independent and compliant with comprehensive HEP to maintain progress achieved in PT   Current LEFS 44% Goal 20%      Plan: Continue PT 2 x weekly for up to 12 visits. Pt may prefer to continue with PT HEP after 1 more session; consider discharge after next session, physician f/u for residual knee/foot c/o.    Date: 9/28/2023                TX#: 3/12 Date: 10/5/2023                 TX#: 4/12 Date:  10/11/2023               TX#: 5/12 Date: 10/18/2023  Tx#: 6/12   There Ex:   Stationary bike: level 1 x 2 min  L/R plantar fascia stretch x 30 sec each  Supine DKC with LE supported on ball 5 x 5 sec each  Bridge x 10 reps (HEP)   There Ex:   Stationary bike: level 1 x 3 min  L/R plantar fascia stretch x 30 sec  Standing L/R gastroc stretch x 20 sec each   Lateral steps at bar with RTB around ankles  4 x ~ 16 feet - too easy  Seated L knee ext- flexion AROM/heel slide 5 x 5 sec each (HEP)  Reformer: B squats x 10 reps all springs; L/R unilateral leg press  2 x 10 reps each all springs; B heel raises x 10 reps all springs There Ex:   Stationary bike: level 1 x 3 min  Supine L/R Knee Flexion AAROM with LE supported on ball 5 x 15 sec each  Supine L/R knee AAROM ext with LE supported on ball, gentle PT overpressure 5 x 15 sec each  Reformer: B squats x 10 reps all springs; L/R unilateral leg press  2 x 10 reps each all springs  Standing L/R hip flexor/gastroc stretch using staircase x 30 sec each  Lateral steps with GTB at ankles 3 x ~ 16 feet There Ex:   Stationary bike: level 1 x 3 min  Seated L/R LAQ 10 x 2 sec (HEP)  Standing L/R hip flexor/gastroc stretch using staircase x 30 sec each  Standing L/R hamstrings stretch x 20 sec each  Supine L/R Knee Flexion/Extension AAROM with LE supported on ball 5 x 5 sec each  Reformer: B squats x 10 reps all springs; L/R unilateral leg press  3 x 10 reps each all springs  Lateral steps with GTB at ankles 4 x ~ 16 feet  Exercise considerations for c/o ankle/toe stiffness: B ankle circles, seated toe ext ROM/stretch/heel raises   Manual:   R patella sup/inf and med/lat glides  STM B plantar fascia Manual:   STM B plantar fascia Manual:   [facility] B plantar fascia  L/R patella mobilization sup/inf and med/lat Manual:   STM B plantar fascia  L/R patella mobilization sup/inf and med/lat   There Activities:   Reviewed consideration of pool, stationary bike for knee OA/LE edema, leg press for strengthening  Considerations for plantar fasciitis: gastroc/plantar fascia stretches mornings when get out of bed; heel/toe raises, stretches when sitting   Edema management: compression socks (pt reports has custom compression socks, too intense, wear OTC now), elevation, muscle pump mechanisim for edema reduction  Advised pt may consider IFC/TENs unit for knee OA if helpful   Pt reports using SPC at times for knee OA; pt edu re: SPC - if helpful, advised appropriate height, may consider knee braces with patella aperture TA:   May consider plantar fasciitis night splint, although c/o seem to be managed by current regiments (stretches, footwear, STM)  Reviewed considerations for knee OA: knee brace, footwear, heat/cold, swim/pool, bike TA:   Pt education/review re: edema management: compression socks, LE elevation (pt may consider LE up/on wall)  Re: knee OA, balance activity with rest; consider biking, pool There Activities:   Considerations for trip to [place]; pace walking, rest/sitting breaks, footwear, consider knee sleeves (pt report not helpful in the past)          HEP: gastroc and plantar fascia stretches, seated heel/toe raises; bridge; supine/seated heel slides/knee flex-ext AROM. Leg press: unilateral/bilateral ( LAQ if needed). Consider pool/stationary bike. MHP/cold pack for knee OA.  May consider ankle circles, seated MTP ext stretch/ROM    Charges: 3 TE (35 min), 0 TA (5 min), 0 manual (5 min)   Total Timed Treatment: 45 min  Total Treatment Time: 45 min

## 2023-10-19 ENCOUNTER — OFFICE VISIT (OUTPATIENT)
Dept: SLEEP CENTER | Age: 58
End: 2023-10-19
Attending: PHYSICIAN ASSISTANT
Payer: COMMERCIAL

## 2023-10-19 DIAGNOSIS — G47.33 OSA (OBSTRUCTIVE SLEEP APNEA): Primary | ICD-10-CM

## 2023-10-19 PROCEDURE — 95806 SLEEP STUDY UNATT&RESP EFFT: CPT

## 2023-10-22 NOTE — PROCEDURES
[address] [facility]  Accredited by the [place] [employer] (AASM)    PATIENT'S NAME: Cody Nelson   ATTENDING PHYSICIAN: Nimisha Weinstein PA-C   REFERRING PHYSICIAN: Nimisha Weinstein PA-C   PATIENT ACCOUNT #: [de-identified] LOCATION: [address] #: [id number] YOB: 1965   DATE OF STUDY: 10/19/2023       SLEEP STUDY REPORT    STUDY TYPE:  Home sleep test.    INDICATION:  Suspected obstructive sleep apnea (ICD-10 code G47.33) in patient with a body mass index 42.6, an [place] Sleepiness Scale score of 8/24, snoring, prior history of NGOC, unrefreshing sleep, nocturia, daytime somnolence, drowsy driving, GERD, and the patient is currently using an oral appliance therapy. RESULTS:  The patient underwent home sleep test with measurement of his nasal air flow, nasal air pressure, snoring, chest and abdominal wall motion, oximetry, and body position. I have reviewed the entirety of the raw data of this study. During this study, the total recording time was 465 minutes. The lights-out clock time is 12:09 a.m., the lights-on clock time is 7:54 a.m. The apnea plus hypopnea index is 30.5 events per hour and this sahil to 38.7 events per hour in the supine position. The average oxygen saturation is 92%, the minimum saturation is 76%, and the patient spent 7% of the testing with saturations 88% or less. The average heart rate is 74 beats per minute. INTERPRETATION:  The data generated from this study is consistent with severe obstructive sleep apnea which is worse in the supine position (ICD-10 code G47.33). RECOMMENDATIONS:    1. CPAP titration. 2.   Weight loss. 3.   Avoid alcohol. 4.   Avoid sedating drug. 5.   Patient should not drive if at all sleepy. Please do not hesitate to contact me if there is any question whatsoever regarding interpretation of this study.     Dictated By Yudy Vitale MD  d: 10/21/2023 18:49:07  t: 10/21/2023 [address] [facility] [id number]  [facility]/    cc: Mary C. Ellie Sandifer, MD Camilla Penner, PA-C

## 2023-10-23 ENCOUNTER — OFFICE VISIT (OUTPATIENT)
Dept: PHYSICAL THERAPY | Age: 58
End: 2023-10-23
Attending: FAMILY MEDICINE
Payer: COMMERCIAL

## 2023-10-23 DIAGNOSIS — G47.33 OSA (OBSTRUCTIVE SLEEP APNEA): Primary | ICD-10-CM

## 2023-10-23 PROCEDURE — 97110 THERAPEUTIC EXERCISES: CPT | Performed by: PHYSICAL THERAPIST

## 2023-10-23 NOTE — PROGRESS NOTES
[facility]  Pt has attended 7 visits in Physical Therapy. Diagnosis:   Osteoarthritis of both knees, unspecified osteoarthritis type (M17.0)    Referring Provider: Margarita Grace MD Date of Evaluation:    8/17/2023    Precautions:   venous insufficiency and associated LE edema Next MD visit:   none scheduled  Date of Surgery: n/a   Insurance Primary/Secondary: [employer] INS / N/A     # Auth Visits: No auth/no limit            Subjective:  My knees really felt it after carrying heavy boxes of glasses up/down stairs this past week. I think that my knees are going to hurt me forever. See [facility] doctor tomorrow for my knees. Plantar fasciitis is much better since started PT. Knee pain intensity has improved with PT. Pain:  0 - 5/10 range of knee pain      Objective:  B distal/foot edema (pt reports PCP aware; to undergo vein evaluation)    L/R knee AROM ~ - 3 - 110 deg   R >L patella hypomobility  Sit- standing: pt uses B UE A, able to completed without UE A when asked to do so  Gait: B flat feet, B foot ovepronation  Pt able to reciprocal negotiate 6 inch steps without c/o   L/R SLS - each able  LE MMT:  Knee flex/ext each 5/5, Hip abd/ER MMT each 5-/5    Assessment:  Pt with c/o chronic knee pain related to OA. Have reviewed HEP and other considerations in effort to reduce/manage c/o. Advisable for pt to f/u with physician re: chronic knee pain. Pt reports plantar fasciitis has improved significantly with limited PT interventions. Reviewed consideration of arch support in shoes for both plantar fasciitis and knee OA. Discussed consideration of roller vs carrying bag in effort to reduce stress on knees/symptoms. PT sasha session well, c/o no worse at close of PT session.     Goals:   (to be met in 12 visits) - PT MADE PROGRESS TOWARD ALL GOALS  Pt will improve knee extension ROM to   0 deg to allow proper heel strike during gait and terminal knee extension in stance   Pt will improve knee AROM flexion to equal opposite side in degrees to improve ability to perform squat, stairs, other functional ADLs   Pt will improve quad strength to 5/5 to ascend 1 flight of stairs reciprocally without UE assist   Pt will increase hip and knee strength to grossly 4+/5 to be able to get up and down from the floor safely   Pt will demonstrate increased hip ER/ABD strength to 5/5 to perform stepping and squatting activities without excessive femoral IR/ADD   Pt will improve SLS to equal opposite side to improve safety with gait on uneven surfaces such as grass and gravel  Pt will be independent and compliant with comprehensive HEP to maintain progress achieved in PT   Current LEFS 44% Goal 20%      Plan: Discharge patient from PT to HEP as well sasha due to completing course of PT, independent with PT HEP. Physician f/u re: persistent B knee pain related to OA.    Date: 10/5/2023                 TX#: 4/12 Date:  10/11/2023               TX#: 5/12 Date: 10/18/2023  Tx#: 6/12 Date: 10/23/2023  Tx #: 7/12   There Ex:   Stationary bike: level 1 x 3 min  L/R plantar fascia stretch x 30 sec  Standing L/R gastroc stretch x 20 sec each   Lateral steps at bar with RTB around ankles  4 x ~ 16 feet - too easy  Seated L knee ext- flexion AROM/heel slide 5 x 5 sec each (HEP)  Reformer: B squats x 10 reps all springs; L/R unilateral leg press  2 x 10 reps each all springs; B heel raises x 10 reps all springs There Ex:   Stationary bike: level 1 x 3 min  Supine L/R Knee Flexion AAROM with LE supported on ball 5 x 15 sec each  Supine L/R knee AAROM ext with LE supported on ball, gentle PT overpressure 5 x 15 sec each  Reformer: B squats x 10 reps all springs; L/R unilateral leg press  2 x 10 reps each all springs  Standing L/R hip flexor/gastroc stretch using staircase x 30 sec each  Lateral steps with GTB at ankles 3 x ~ 16 feet There Ex:   Stationary bike: level 1 x 3 min  Seated L/R LAQ 10 x 2 sec (HEP)  Standing L/R hip flexor/gastroc stretch using staircase x 30 sec each  Standing L/R hamstrings stretch x 20 sec each  Supine L/R Knee Flexion/Extension AAROM with LE supported on ball 5 x 5 sec each  Reformer: B squats x 10 reps all springs; L/R unilateral leg press  3 x 10 reps each all springs  Lateral steps with GTB at ankles 4 x ~ 16 feet  Exercise considerations for c/o ankle/toe stiffness: B ankle circles, seated toe ext ROM/stretch/heel raises There Ex:   HEP review/practice; see below. Pt education/practice re: R patella self mobilization (HEP - aloption)   Supine knee flex-ext AAROM with LE supported on ball 5 x 10 sec each  Attempted standing quad stretch stretch using chair - pt unable to complete  Reformer: B squats x 10 reps all springs; L/R unilateral leg press  3 x 10 reps each all springs  Lateral steps with GTB at ankles 4 x ~ 16 feet (HEP - optional)  Mini squat x 5 reps - curing for form (HEP - optional)     Manual:   STM B plantar fascia Manual:   STM B plantar fascia  L/R patella mobilization sup/inf and med/lat Manual:   STM B plantar fascia  L/R patella mobilization sup/inf and med/lat Manual:   L/R patella mobilization sup/inf and med/lat   TA:   May consider plantar fasciitis night splint, although c/o seem to be managed by current regiments (stretches, footwear, STM)  Reviewed considerations for knee OA: knee brace, footwear, heat/cold, swim/pool, bike TA:   Pt education/review re: edema management: compression socks, LE elevation (pt may consider LE up/on wall)  Re: knee OA, balance activity with rest; consider biking, pool There Activities:   Considerations for trip to [place]; pace walking, rest/sitting breaks, footwear, consider knee sleeves (pt report not helpful in the past)           HEP: gastroc and plantar fascia stretches, seated heel/toe raises; bridge; supine/seated heel slides/knee flex-ext AROM.  Leg press: unilateral/bilateral ( LAQ if needed) or mini squat if well sasha), lateral steps with GTB (Optional), self patella mobilization (optional). Consider pool/stationary bike. MHP/cold pack for knee OA. May consider ankle circles    Charges: 3 TE (35 min), 0 manual (5 min)   Total Timed Treatment: 40 min  Total Treatment Time: 45 min    LEFS Score  LEFS Score: 43.75 % (8/17/2023  4:37 PM)    Post LEFS Score  Post LEFS Score: 60 % (10/23/2023  1:03 PM)    16.25 % improvement    Plan: Discharge patient from PT to HEP as well sasha due to completing course of PT, independent with PT HEP. Physician f/u re: persistent B knee pain related to OA. Patient/Family/Caregiver was advised of these findings, precautions, and treatment options and has agreed to actively participate in planning and for this course of care. Thank you for your referral. If you have any questions, please contact me at Dept: [phone]. Sincerely,  Electronically signed by therapist: Becka Bill PT     Physician's certification required:  Yes  Please co-sign or sign and return this letter via fax as soon as possible to [phone]. I certify the need for these services furnished under this plan of treatment and while under my care.     X___________________________________________________ Date____________________    Certification From: 07/93/8825  To:1/21/2024

## 2023-10-30 ENCOUNTER — ANESTHESIA EVENT (OUTPATIENT)
Dept: ENDOSCOPY | Facility: HOSPITAL | Age: 58
End: 2023-10-30
Payer: COMMERCIAL

## 2023-10-30 ENCOUNTER — ANESTHESIA (OUTPATIENT)
Dept: ENDOSCOPY | Facility: HOSPITAL | Age: 58
End: 2023-10-30
Payer: COMMERCIAL

## 2023-10-30 ENCOUNTER — HOSPITAL ENCOUNTER (OUTPATIENT)
Facility: HOSPITAL | Age: 58
Setting detail: HOSPITAL OUTPATIENT SURGERY
Discharge: HOME | End: 2023-10-30
Attending: INTERNAL MEDICINE | Admitting: INTERNAL MEDICINE
Payer: COMMERCIAL

## 2023-10-30 VITALS
DIASTOLIC BLOOD PRESSURE: 69 MMHG | HEIGHT: 72 IN | TEMPERATURE: 98 F | OXYGEN SATURATION: 97 % | BODY MASS INDEX: 40.63 KG/M2 | WEIGHT: 300 LBS | HEART RATE: 82 BPM | SYSTOLIC BLOOD PRESSURE: 113 MMHG | RESPIRATION RATE: 19 BRPM

## 2023-10-30 DIAGNOSIS — Z80.0 FAMILY HISTORY OF GASTRIC CANCER: ICD-10-CM

## 2023-10-30 DIAGNOSIS — K76.0 HEPATIC STEATOSIS: ICD-10-CM

## 2023-10-30 DIAGNOSIS — R10.13 EPIGASTRIC PAIN: ICD-10-CM

## 2023-10-30 DIAGNOSIS — R68.81 EARLY SATIETY: ICD-10-CM

## 2023-10-30 DIAGNOSIS — R74.8 ELEVATED LIVER ENZYMES: ICD-10-CM

## 2023-10-30 DIAGNOSIS — K21.9 GASTROESOPHAGEAL REFLUX DISEASE, UNSPECIFIED WHETHER ESOPHAGITIS PRESENT: ICD-10-CM

## 2023-10-30 PROCEDURE — 45380 COLONOSCOPY AND BIOPSY: CPT | Performed by: INTERNAL MEDICINE

## 2023-10-30 PROCEDURE — 0DB68ZX EXCISION OF STOMACH, VIA NATURAL OR ARTIFICIAL OPENING ENDOSCOPIC, DIAGNOSTIC: ICD-10-PCS | Performed by: INTERNAL MEDICINE

## 2023-10-30 PROCEDURE — 43239 EGD BIOPSY SINGLE/MULTIPLE: CPT | Performed by: INTERNAL MEDICINE

## 2023-10-30 PROCEDURE — 0DB38ZX EXCISION OF LOWER ESOPHAGUS, VIA NATURAL OR ARTIFICIAL OPENING ENDOSCOPIC, DIAGNOSTIC: ICD-10-PCS | Performed by: INTERNAL MEDICINE

## 2023-10-30 PROCEDURE — 0DBM8ZX EXCISION OF DESCENDING COLON, VIA NATURAL OR ARTIFICIAL OPENING ENDOSCOPIC, DIAGNOSTIC: ICD-10-PCS | Performed by: INTERNAL MEDICINE

## 2023-10-30 PROCEDURE — 0DBP8ZX EXCISION OF RECTUM, VIA NATURAL OR ARTIFICIAL OPENING ENDOSCOPIC, DIAGNOSTIC: ICD-10-PCS | Performed by: INTERNAL MEDICINE

## 2023-10-30 RX ORDER — NALOXONE HYDROCHLORIDE 0.4 MG/ML
80 INJECTION, SOLUTION INTRAMUSCULAR; INTRAVENOUS; SUBCUTANEOUS AS NEEDED
Status: DISCONTINUED | OUTPATIENT
Start: 2023-10-30 | End: 2023-10-30

## 2023-10-30 RX ORDER — SODIUM CHLORIDE, SODIUM LACTATE, POTASSIUM CHLORIDE, CALCIUM CHLORIDE 600; 310; 30; 20 MG/100ML; MG/100ML; MG/100ML; MG/100ML
INJECTION, SOLUTION INTRAVENOUS CONTINUOUS
Status: DISCONTINUED | OUTPATIENT
Start: 2023-10-30 | End: 2023-10-30

## 2023-10-30 RX ORDER — LIDOCAINE HYDROCHLORIDE 10 MG/ML
INJECTION, SOLUTION EPIDURAL; INFILTRATION; INTRACAUDAL; PERINEURAL AS NEEDED
Status: DISCONTINUED | OUTPATIENT
Start: 2023-10-30 | End: 2023-10-30 | Stop reason: SURG

## 2023-10-30 RX ORDER — MIDAZOLAM HYDROCHLORIDE 1 MG/ML
INJECTION INTRAMUSCULAR; INTRAVENOUS AS NEEDED
Status: DISCONTINUED | OUTPATIENT
Start: 2023-10-30 | End: 2023-10-30 | Stop reason: SURG

## 2023-10-30 RX ORDER — GLYCOPYRROLATE 0.2 MG/ML
INJECTION, SOLUTION INTRAMUSCULAR; INTRAVENOUS AS NEEDED
Status: DISCONTINUED | OUTPATIENT
Start: 2023-10-30 | End: 2023-10-30 | Stop reason: SURG

## 2023-10-30 RX ADMIN — MIDAZOLAM HYDROCHLORIDE 2 MG: 1 INJECTION INTRAMUSCULAR; INTRAVENOUS at 10:34:00

## 2023-10-30 RX ADMIN — SODIUM CHLORIDE, SODIUM LACTATE, POTASSIUM CHLORIDE, CALCIUM CHLORIDE: 600; 310; 30; 20 INJECTION, SOLUTION INTRAVENOUS at 10:28:00

## 2023-10-30 RX ADMIN — LIDOCAINE HYDROCHLORIDE 50 MG: 10 INJECTION, SOLUTION EPIDURAL; INFILTRATION; INTRACAUDAL; PERINEURAL at 10:35:00

## 2023-10-30 RX ADMIN — GLYCOPYRROLATE 0.2 MG: 0.2 INJECTION, SOLUTION INTRAMUSCULAR; INTRAVENOUS at 10:36:00

## 2023-10-30 RX ADMIN — MIDAZOLAM HYDROCHLORIDE 2 MG: 1 INJECTION INTRAMUSCULAR; INTRAVENOUS at 10:29:00

## 2023-10-30 RX ADMIN — SODIUM CHLORIDE, SODIUM LACTATE, POTASSIUM CHLORIDE, CALCIUM CHLORIDE: 600; 310; 30; 20 INJECTION, SOLUTION INTRAVENOUS at 11:12:00

## 2023-10-30 NOTE — ANESTHESIA PREPROCEDURE EVALUATION
Anesthesia PreOp Note    HPI:     Rosemary Lee is a 59year old male who presents for preoperative consultation requested by: Chris Werner MD    Date of Surgery: 10/30/2023    Procedure(s):  COLONOSCOPY / ESOPHAGOGASTRODUODENOSCOPY  ESOPHAGOGASTRODUODENOSCOPY (EGD)  Indication: Gastroesophageal reflux disease, unspecified whether esophagitis present /Elevated liver enzymes /Epigastric pain /Early satiety /Family history of gastric cancer /Hepatic steatosis    Relevant Problems   No relevant active problems       NPO:                         History Review:  Patient Active Problem List    Morbid obesity with BMI of 40.0-44.9, adult ([facility] [address]         Date Noted: 12/13/2021      Sleep apnea         Date Noted: 12/13/2021      Hepatic steatosis         Date Noted: 06/26/2020      Family history of prostate cancer         Date Noted: 09/12/2019      Bilateral leg edema         Date Noted: 12/21/2017      Esophageal reflux         Date Noted: 05/27/2014      Hypercholesterolemia         Date Noted: 02/11/2013      Obesity         Date Noted: 05/10/2011      Generalized osteoarthrosis, involving multiple sites         Date Noted: 04/16/2010      Low back pain         Date Noted: 03/19/2010      Venous (peripheral) insufficiency         Date Noted: 03/09/2010        Past Medical History:   Diagnosis Date    Arthritis 2018    knees    Back pain     Bilateral lower extremity edema 2012    perpheral BLE edema    Esophageal reflux     medication, EGD 2013    Hemorrhoids 2001    colonoscopy, nl colonoscopy 2013    High cholesterol     Obesity, unspecified     Sleep apnea     Snoring 12/13/2021    Weight gain 12/13/2021       Past Surgical History:   Procedure Laterality Date    COLONOSCOPY  2001    colonoscopy    COLONOSCOPY  3/22/2013    nl colonoscopy     LASIK  1999    \"lasix eye surgery\" per NG. [LATERALITY NOT STATED]    UPPER GI ENDOSCOPY,BIOPSY  3/22/2013    gastroesophageal reflux disease       diclofenac 50 MG Oral Tab EC, Take 1 tablet (50 mg total) by mouth 2 (two) times daily. , Disp: , Rfl:   tamsulosin (FLOMAX) 0.4 MG Oral Cap, Take 1 capsule (0.4 mg total) by mouth daily. , Disp: 90 capsule, Rfl: 3  SIMVASTATIN 40 MG Oral Tab, TAKE ONE TABLET BY MOUTH NIGHTLY, Disp: 90 tablet, Rfl: 3  Cholecalciferol (VITAMIN D) 50 MCG (2000 UT) Oral Cap, Take by mouth., Disp: , Rfl:   Zinc Sulfate (ZINC 15 OR), Take by mouth., Disp: , Rfl:   Coenzyme Q10 (COQ10) 100 MG Oral Cap, Take by mouth., Disp: , Rfl:   omeprazole (PRILOSEC) 20 MG Oral Capsule Delayed Release, Take 1 capsule by mouth every morning., Disp: 90 capsule, Rfl: 3  omega-3 fatty acids (FISH OIL) 1000 MG Oral Cap, Take  by mouth., Disp: , Rfl:   triamcinolone 0.1 % External Ointment, Apply 1 Application topically 2 (two) times daily. , Disp: 30 g, Rfl: 1      No current [facility]-ordered facility-administered medications on file. No current [facility]-ordered outpatient medications on file. No Known Allergies    Family History   Problem Relation Age of Onset    Dementia Mother     Arthritis Mother         possible arthritis    Neurological Disorder Mother         possible Parkinson's    Cancer Father         bladder    Diabetes Father         DM    Heart Disease Father         CAD    Cataracts Father         cataract surgery    Diabetes Brother     Rash Brother         eczema     Social History    Socioeconomic History      Marital status: Single    Tobacco Use      Smoking status: Never      Smokeless tobacco: Never    Vaping Use      Vaping Use: Never used    Substance and Sexual Activity      Alcohol use: Yes        Comment: beer & liquor, 5 drinks, once weekly      Drug use: Never    Other Topics      Concerns:        Caffeine Concern: Yes          Red Bull 5 hour energy drink, occasionally      Available pre-op labs reviewed. Vital Signs: There is no height or weight on file to calculate BMI.   vitals were not taken for this visit.    There were no vitals filed for this visit. Anesthesia Evaluation     Patient summary reviewed and Nursing notes reviewed    Airway   Mallampati: III  Dental      Pulmonary    (+) decreased breath sounds  Cardiovascular     ECG reviewed  Rhythm: regular  Rate: normal  ROS comment: Normal stress 2017    Neuro/Psych      GI/Hepatic/Renal    (+) GERD, liver disease    Endo/Other      Comments: Obese  Abdominal   (+) obese                 Anesthesia Plan:   ASA:  3  Plan:   MAC  Informed Consent Plan and Risks Discussed With:  Patient  Discussed plan with:  Surgeon      I have informed Brianafurt and/or legal guardian or family member of the nature of the anesthetic plan, benefits, risks including possible dental damage if relevant, major complications, and any alternative forms of anesthetic management. All of the patient's questions were answered to the best of my ability. The patient desires the anesthetic management as planned.   Abdi Mendez CRNA  10/30/2023 9:15 AM  Present on Admission:  **None**

## 2023-10-30 NOTE — OPERATIVE REPORT
ESOPHAGOGASTRODUODENOSCOPY (EGD) & COLONOSCOPY REPORT    Zaynab Gerard     1/10/1965 Age 59year old   PCP Lima Romero MD Endoscopist Mayo Fabry, MD     Date of procedure: 10/30/23    Procedure: EGD w/biopsies & Colonoscopy w/cold biopsy polypectomy and cold biopsy    Pre-operative diagnosis: reflux and dysphagia and screening colonoscopy    Post-operative diagnosis: gastric erythema, gastric polyps, r/o barretts, descending and rectal erythema, diverticulosis and hemorrhoids     Medications: MAC sedation    Withdrawal time: 16 minutes    Complications: none    Procedure: Informed consent was obtained from the patient after the risks of the procedure were discussed, including but not limited to bleeding, perforation, aspiration, infection, or possibility of a missed lesion. We discussed the risks/benefits and alternatives to this procedure, as well as the planned sedation. EGD procedure: The patient was placed in the left lateral decubitus position and begun on continuous blood pressure pulse oximetry and EKG monitoring and this was maintained throughout the procedure. Once an adequate level of sedation was obtained a bite block was placed. Then the lubricated tip of the [id number] diagnostic video upper endoscope was inserted and advanced using direct visualization into the posterior pharynx and ultimately into the esophagus. Colonoscopy procedure: Once an adequate level of sedation was obtained a digital rectal exam was completed. Then the lubricated tip of the [id number] diagnostic video colonoscope was inserted and advanced without difficulty to the cecum using the CO2 insufflation technique. The cecum was identified by localizing the trifold, the appendix and the ileocecal valve. A routine second examination of the cecum/ascending colon was performed. Withdrawal was begun with thorough washing and careful examination of the colonic walls and folds. Photodocumentation was obtained.  The bowel prep was good. Views of the colon were excellent with washing. I then carefully withdrew the instrument from the patient who tolerated the procedure well. Complications: None    EGD findings:      1. Esophagus: The squamocolumnar junction was noted at 42 cm and appeared irregular with one island of salmon colored mucosa biopsied to rule out barretts. The GE junction was noted at 42 cm from the incisors. Sliding 1-2 cm hiatal hernia. The esophageal mucosa appeared normal.   2. Stomach: The stomach distended normally. Normal rugal folds were seen. The pylorus was patent. The gastric mucosa appeared erythematous so biopsied to rule out Hpylori. Small 2-3 mm polyps two removed with cold biopsy polypectomy and retrieved. Retroflexion revealed a normal fundus and a normal cardia. 3. Duodenum: The duodenal mucosa appeared normal in the 1st and 2nd portion of the duodenum. Colonoscopy findings:    1. DONNY: normal rectal tone, no masses palpated. 2. NO polyp(s) noted   3. Terminal ileum: the visualized mucosa appeared normal.  4. The colonic mucosa throughout the colon showed normal vascular pattern, without evidence of angioectasias or inflammation. There was some erythema in the descending biopsied possible segmental colitis associated with diverticulosis and some rectal erythema biopsied  5. Diverticulosis: left sided. 6. A retroflexed view of the rectum revealed hemorrhoids. Recommend:  Await pathology, likely repeat colonoscopy in 10 years pending pathology. The interval for the next colonoscopy will be determined after reviewing pathology. If new signs or symptoms develop, colonoscopy may need to be repeated sooner. High fiber diet. Monitor for blood in the stool. If having more than just tinge of blood, call office or go to the ER.   Here are some reflux precautions:   - Avoid trigger foods  - Anti-reflux measures: raising the head of the bed at night, avoiding tight clothing or belts, avoiding eating late at night and not lying down shortly after mealtime and achieving weight loss   - Avoid NSAID's, caffeine, peppermints, alcohol, tobacco and foods that incite symptoms     >>>If tissue was obtained and you have not received your pathology results either by phone or letter within 2 weeks, please call our office at [phone].     Specimens: gastric biopsies, gastric polyps, distal esophageal biopsies, descending colon biopsies and rectal biopsies

## 2023-10-30 NOTE — DISCHARGE INSTRUCTIONS

## 2023-11-03 ENCOUNTER — TELEPHONE (OUTPATIENT)
Dept: GASTROENTEROLOGY | Facility: CLINIC | Age: 58
End: 2023-11-03

## 2023-11-03 NOTE — TELEPHONE ENCOUNTER
----- Message from Cole Cisneros MD sent at 11/1/2023  9:17 AM CDT -----  GI staff: please place recall in for colonoscopy in 10 years

## 2023-11-03 NOTE — TELEPHONE ENCOUNTER
Health maintenance updated. 10 year colonoscopy recall placed in patient outreach. Next due on 10/30/2033 per Dr. Joanna Ladd.

## 2023-11-08 ENCOUNTER — MED REC SCAN ONLY (OUTPATIENT)
Facility: CLINIC | Age: 58
End: 2023-11-08

## 2023-11-09 RX ORDER — SIMVASTATIN 40 MG
40 TABLET ORAL NIGHTLY
Qty: 90 TABLET | Refills: 3 | Status: SHIPPED | OUTPATIENT
Start: 2023-11-09

## 2023-11-09 NOTE — TELEPHONE ENCOUNTER
Refill passed per [facility] protocol.     Requested Prescriptions   Pending Prescriptions Disp Refills    SIMVASTATIN 40 MG Oral Tab [Pharmacy Med Name: Simvastatin 40 Mg Tab Nort] 90 tablet 0     Sig: TAKE ONE TABLET BY MOUTH NIGHTLY       Cholesterol Medication Protocol Passed - 11/9/2023  1:30 AM        Passed - ALT in past 12 months        Passed - LDL in past 12 months        Passed - Last ALT < 80     Lab Results   Component Value Date    ALT 50 (H) 06/01/2023             Passed - Last LDL < 130     Lab Results   Component Value Date    LDL 98 09/28/2023             Passed - In person appointment or virtual visit in the past 12 mos or appointment in next 3 mos     Recent Outpatient Visits              2 weeks ago     [facility] Gabrielle Car, PT    Office Visit    3 weeks ago NGOC (obstructive sleep apnea)    [facility]    Office Visit    3 weeks ago     [facility] Gabrielle Car, PT    Office Visit    4 weeks ago     [facility] JosepGabrielle, PT    Office Visit    1 month ago     [facility] JosepGabrielle, PT    Office Visit          Future Appointments         Provider Department Appt Notes    In 5 days [facility] [id number] PENDING case # [id number]                    [email or web address]      [email or web address]

## 2023-11-14 ENCOUNTER — TELEPHONE (OUTPATIENT)
Dept: PULMONOLOGY | Facility: CLINIC | Age: 58
End: 2023-11-14

## 2023-11-14 DIAGNOSIS — G47.33 OSA (OBSTRUCTIVE SLEEP APNEA): Primary | ICD-10-CM

## 2023-11-14 NOTE — TELEPHONE ENCOUNTER
Radha Martines from sleep center is calling states sleep study has been denied. An autopap machine has been approved and can be ordered.

## 2023-11-16 NOTE — TELEPHONE ENCOUNTER
Auto CPAP order, face sheet, home sleep study and office visit notes faxed to [facility] at [phone]. Fax confirmation received. [employer] message sent to patient with [facility]'s phone number and instructions to return to clinic 31-90 days after receiving CPAP machine.

## 2023-12-01 ENCOUNTER — PATIENT MESSAGE (OUTPATIENT)
Dept: FAMILY MEDICINE CLINIC | Facility: CLINIC | Age: 58
End: 2023-12-01

## 2023-12-01 DIAGNOSIS — G47.33 OSA (OBSTRUCTIVE SLEEP APNEA): Primary | ICD-10-CM

## 2023-12-01 DIAGNOSIS — M17.0 PRIMARY OSTEOARTHRITIS OF BOTH KNEES: ICD-10-CM

## 2023-12-03 NOTE — TELEPHONE ENCOUNTER
From: Yolanda Haji  To: Lima Cali  Sent: 12/1/2023 4:03 PM CST  Subject: CPAP Follow-up    Hi Dr. Alissa Cali    I pickup my cpap machine yesterday and started using it last night. I scheduled a follow up appointment with Dr. Carly Dillon for February 13th (I am required to see her again prior to 90 days from start of use of the machine). However, another referral from you is required before the insurance company will confirm it. This is also the case with a follow up appointment for the Jacqueline 1690. I had an appointment scheduled with them for next Tuesday, but they only contacted me today to tell me that yet another referral notice was required for the 2nd appointment. I canceled the appointment due to the fact that I did not think you could get a referral notice over to them in time based on the delays for the first visit. Please let me know when the notice goes out to Dr. Luis F Casanova.     [address]

## 2023-12-04 ENCOUNTER — TELEPHONE (OUTPATIENT)
Dept: PULMONOLOGY | Facility: CLINIC | Age: 58
End: 2023-12-04

## 2023-12-04 NOTE — TELEPHONE ENCOUNTER
Received fax from [employer] that patient is due for follow up between 12/31/23 and 2/28/24.  Patient is scheduled for appt on 2/13/24

## 2023-12-05 NOTE — TELEPHONE ENCOUNTER
Dr. Marely Rodriguez,   Please have staff update health insurance in patient's chart with the information he sent below.    Thank you

## 2024-02-02 ENCOUNTER — MED REC SCAN ONLY (OUTPATIENT)
Dept: FAMILY MEDICINE CLINIC | Facility: CLINIC | Age: 59
End: 2024-02-02

## 2024-02-13 ENCOUNTER — OFFICE VISIT (OUTPATIENT)
Dept: PULMONOLOGY | Facility: CLINIC | Age: 59
End: 2024-02-13
Payer: COMMERCIAL

## 2024-02-13 VITALS
HEART RATE: 79 BPM | SYSTOLIC BLOOD PRESSURE: 120 MMHG | HEIGHT: 72 IN | OXYGEN SATURATION: 95 % | WEIGHT: 315 LBS | DIASTOLIC BLOOD PRESSURE: 69 MMHG | BODY MASS INDEX: 42.66 KG/M2

## 2024-02-13 DIAGNOSIS — G47.33 OSA (OBSTRUCTIVE SLEEP APNEA): Primary | ICD-10-CM

## 2024-02-13 PROCEDURE — 99213 OFFICE O/P EST LOW 20 MIN: CPT | Performed by: PHYSICIAN ASSISTANT

## 2024-02-13 NOTE — PROGRESS NOTES
Pulmonary Progress Note    History of Present Illness:  Leonardo Overton is a 59 year old male presenting to pulmonary clinic today for follow-up of NGOC. The patient is doing well and is benefiting from CPAP use. Sleep is more refreshing and he is less tired during the day. There is improvement in nocturnal awakenings which are now rare. Data download demonstrates average daily usage of 6 hours and 12 minutes with residual respiratory events of 2.3/h on APAP 5-15 CWP. Sometimes he feels like the CPAP pressure is too low.     Review of Systems:   Constitutional: +15 pound weight gain in the last 6 months.  HEENT: No congestion or postnasal drip.  Cardio: No chest pain.  Respiratory: No shortness of breath.  GI: No acid reflux.  Extremities: No lower extremity swelling or pain.   Neurologic: +Occ AM headaches.  Psych: No depression.     Physical Exam:  /69   Pulse 79   Ht 6' (1.829 m)   Wt (!) 318 lb 6.4 oz (144.4 kg)   SpO2 95%   BMI 43.18 kg/m²    Constitutional: Obese. No acute distress.  HEENT: Head NC/AT. PEERL. No tonsillar or uvula enlargement. Mallampati class 4.  Cardio: Regular rate and rhythm. Normal S1 and S2. No murmurs.   Respiratory: Thorax symmetrical with no labored breathing. Clear to ausculation bilaterally with symmetrical breath sounds. No wheezing, rhonchi, or crackles.   Extremities: No clubbing or cyanosis. Bilateral LE edema.  Neurologic: A&Ox3. No gross motor deficits.  Skin: Warm, dry.  Psych: Pleasant affect. Cooperative.    Results:  -Home sleep study 10/2023: Severe NGOC with combined AHI 30.5    Assessment/Plan:  Severe NGOC - Doing well clinically. Data download demonstrates excellent compliance and efficacy. Several nights with AHI up to 10 and he feels pressure is too low.   Plan:  -Adjust pressure to 8-18 CWP  -Vigilance with APAP every night all night  -Weight loss  -Avoid alcohol and sedating drugs  -Never drive if sleepy  -Follow-up at the 1-year interval again with download  of data  -Call if new problems in the interim    Zion Bunn PA-C  Pulmonary Medicine  2/13/2024

## 2024-02-16 NOTE — PROGRESS NOTES
Faxed order to change APAP pressure to [facility]. Attached ovn. Confirmation received and sent to scanning

## 2024-02-26 ENCOUNTER — TELEPHONE (OUTPATIENT)
Dept: FAMILY MEDICINE CLINIC | Facility: CLINIC | Age: 59
End: 2024-02-26

## 2024-02-26 NOTE — TELEPHONE ENCOUNTER
Regarding: YANN HuntP.M.  Contact: [phone]  ----- Message from Olivia Scott sent at 2/26/2024  2:04 PM CST -----       ----- Message sent from Kelly Bustos RN to OvertonLeonardo at 2/23/2024  5:27 PM -----   Hello,     Your message was sent to our referral department. Normally referrals are requested before an appointment and it may not be able to be back dated.     We will update you as soon as possible.     Thank you,   ATUL Mendoza      ----- Message -----       From:Leonardo Overton       Sent:2/23/2024  4:48 PM CST         To:Lima Rodriguez    Subject:JUNE Hunt.PEDITH.    Hi Dr. Rodriguez    I went to see a podiatrist last September to attend to a badly ingrown toenail.    I just received a letter from him stating that my insurance will not pay for the claim without a referral from you.    He states that he has tried to receive the referral from your office several times without success.      Would you please send it to him.  His contact info is as follows:  Dr. Samir Crowder, JUNE.P.M.  [address]  [facility] [place]. [address]  [phone] Fax     You can also send it to me thru [facility] and I can send it to him as well.    Ortega Overton

## 2024-02-26 NOTE — TELEPHONE ENCOUNTER
Spoke w/pt regarding the retro referral.   Relayed previous msg to pt. Pt verbalized understanding with all questions answered.

## 2024-03-01 ENCOUNTER — PATIENT MESSAGE (OUTPATIENT)
Dept: FAMILY MEDICINE CLINIC | Facility: CLINIC | Age: 59
End: 2024-03-01

## 2024-03-03 NOTE — TELEPHONE ENCOUNTER
Please let patient know definitely could be an option.  There are some risks that we would need to review in detail so please make appointment to discuss.  Okay to use res24 within the next 2 weeks if patient prefers.  Okay if virtual.

## 2024-03-04 ENCOUNTER — TELEPHONE (OUTPATIENT)
Dept: PULMONOLOGY | Facility: CLINIC | Age: 59
End: 2024-03-04

## 2024-03-04 NOTE — TELEPHONE ENCOUNTER
Received fax from [facility] with order for CPAP supplies. Placed in Zion SHARIF's folder for signature

## 2024-03-05 NOTE — TELEPHONE ENCOUNTER
Order signed by Zion SHARIF and faxed back to [facility] with confirmation. Copy placed in the E folder for  and order sent for scanning.

## 2024-05-01 ENCOUNTER — TELEMEDICINE (OUTPATIENT)
Dept: FAMILY MEDICINE CLINIC | Facility: CLINIC | Age: 59
End: 2024-05-01
Payer: COMMERCIAL

## 2024-05-01 ENCOUNTER — PATIENT MESSAGE (OUTPATIENT)
Dept: FAMILY MEDICINE CLINIC | Facility: CLINIC | Age: 59
End: 2024-05-01

## 2024-05-01 DIAGNOSIS — G47.33 OSA (OBSTRUCTIVE SLEEP APNEA): ICD-10-CM

## 2024-05-01 DIAGNOSIS — M17.0 PRIMARY OSTEOARTHRITIS OF BOTH KNEES: ICD-10-CM

## 2024-05-01 DIAGNOSIS — E78.00 HYPERCHOLESTEROLEMIA: ICD-10-CM

## 2024-05-01 DIAGNOSIS — E66.01 MORBID OBESITY WITH BMI OF 40.0-44.9, ADULT (HCC): Primary | ICD-10-CM

## 2024-05-01 PROCEDURE — 99213 OFFICE O/P EST LOW 20 MIN: CPT | Performed by: FAMILY MEDICINE

## 2024-05-01 RX ORDER — NALTREXONE HYDROCHLORIDE AND BUPROPION HYDROCHLORIDE 8; 90 MG/1; MG/1
TABLET, EXTENDED RELEASE ORAL
Qty: 70 TABLET | Refills: 0 | Status: SHIPPED | OUTPATIENT
Start: 2024-05-01 | End: 2024-05-31

## 2024-05-01 NOTE — TELEPHONE ENCOUNTER
From: Leonardo Overton  To: Lima Rodriguez  Sent: 5/1/2024 10:19 AM CDT  Subject: Today's appointment    Dr Rodriguez    Have you forgotten me? I have been online for 20 minutes. Will you be joining our chat?    Chester Overton

## 2024-05-01 NOTE — PROGRESS NOTES
Subjective:   Patient ID: Leonardo Overton is a 59 year old male.    This visit is conducted using Telemedicine with live, interactive video and audio.    Patient has been referred to the [facility] website at [email or web address] to review the yearly Consent to Treat document.    Patient understands and accepts financial responsibility for any deductible, co-insurance and/or co-pays associated with this service.           History/Other:   Review of Systems  Current Outpatient Medications   Medication Sig Dispense Refill    Naltrexone-buPROPion HCl ER (CONTRAVE) 8-90 MG Oral Tablet 12 Hr Week 1: 1 tablet in AM      None in PM Week 2: 1 tablet in AM      1 tablet in PM Week 3: 2 tablets in AM    1 tablet in PM Week 4: [place] 2 tablets in AM     2 tablets in PM 70 tablet 0    simvastatin 40 MG Oral Tab Take 1 tablet (40 mg total) by mouth nightly. TAKE ONE TABLET BY MOUTH NIGHTLY 90 tablet 3    diclofenac 50 MG Oral Tab EC Take 1 tablet (50 mg total) by mouth 2 (two) times daily.      tamsulosin (FLOMAX) 0.4 MG Oral Cap Take 1 capsule (0.4 mg total) by mouth daily. 90 capsule 3    triamcinolone 0.1 % External Ointment Apply 1 Application topically 2 (two) times daily. 30 g 1    Cholecalciferol (VITAMIN D) 50 MCG (2000 UT) Oral Cap Take by mouth.      Zinc Sulfate (ZINC 15 OR) Take by mouth.      Coenzyme Q10 (COQ10) 100 MG Oral Cap Take by mouth.      omeprazole (PRILOSEC) 20 MG Oral Capsule Delayed Release Take 1 capsule by mouth every morning. 90 capsule 3    omega-3 fatty acids (FISH OIL) 1000 MG Oral Cap Take  by mouth.       Allergies:No Known Allergies    Objective:   Physical Exam  Constitutional:       General: He is not in acute distress.     Appearance: Normal appearance.   Pulmonary:      Effort: Pulmonary effort is normal.   Neurological:      Mental Status: He is alert and oriented to person, place, and time.         Assessment & Plan:   1. Morbid obesity with BMI of 40.0-44.9, adult (HCC)    2. NGOC  (obstructive sleep apnea)    3. Primary osteoarthritis of both knees    4. Hypercholesterolemia    Patient with history of morbid obesity with serious comorbidities as above.  He has used diet and exercise programs in the past but had issues with regaining weight.  He has heard about Contrave, would like to consider.  Discussed importance of taking medication in conjunction with diet and exercise program.  Weight last visit 318 pounds, he will confirm this baseline weight before starting medication.  Rx for Contrave sent to pharmacy reviewed instructions and side effects.  He will contact us in 3 to 4 weeks for maintenance dose if doing well.  See me in the office in 2 months.    No orders of the defined types were placed in this encounter.      Meds This Visit:  Requested Prescriptions     Signed Prescriptions Disp Refills    Naltrexone-buPROPion HCl ER (CONTRAVE) 8-90 MG Oral Tablet 12 Hr 70 tablet 0     Sig: Week 1: 1 tablet in AM      None in PM Week 2: 1 tablet in AM      1 tablet in PM Week 3: 2 tablets in AM    1 tablet in PM Week 4: [place] 2 tablets in AM     2 tablets in PM       Imaging & Referrals:  None

## 2024-07-31 ENCOUNTER — OFFICE VISIT (OUTPATIENT)
Dept: FAMILY MEDICINE CLINIC | Facility: CLINIC | Age: 59
End: 2024-07-31
Payer: COMMERCIAL

## 2024-07-31 VITALS
HEART RATE: 66 BPM | SYSTOLIC BLOOD PRESSURE: 115 MMHG | DIASTOLIC BLOOD PRESSURE: 76 MMHG | BODY MASS INDEX: 43.26 KG/M2 | WEIGHT: 309 LBS | OXYGEN SATURATION: 96 % | HEIGHT: 70.87 IN | TEMPERATURE: 98 F

## 2024-07-31 DIAGNOSIS — M54.50 LOW BACK PAIN, UNSPECIFIED BACK PAIN LATERALITY, UNSPECIFIED CHRONICITY, UNSPECIFIED WHETHER SCIATICA PRESENT: ICD-10-CM

## 2024-07-31 DIAGNOSIS — S99.921A INJURY OF TOE ON RIGHT FOOT, INITIAL ENCOUNTER: ICD-10-CM

## 2024-07-31 DIAGNOSIS — E66.01 MORBID OBESITY WITH BMI OF 40.0-44.9, ADULT (HCC): Primary | ICD-10-CM

## 2024-07-31 PROCEDURE — 99213 OFFICE O/P EST LOW 20 MIN: CPT | Performed by: FAMILY MEDICINE

## 2024-07-31 RX ORDER — POLYETHYLENE GLYCOL 3350 17 G/17G
17 POWDER, FOR SOLUTION ORAL DAILY
Qty: 765 G | Refills: 1 | Status: SHIPPED | OUTPATIENT
Start: 2024-07-31

## 2024-07-31 RX ORDER — NALTREXONE HYDROCHLORIDE AND BUPROPION HYDROCHLORIDE 8; 90 MG/1; MG/1
TABLET, EXTENDED RELEASE ORAL
Qty: 70 TABLET | Refills: 0 | Status: SHIPPED | OUTPATIENT
Start: 2024-07-31 | End: 2024-08-01

## 2024-07-31 NOTE — PROGRESS NOTES
Subjective:   Patient ID: Leonardo Overton is a 59 year old male.    Patient presents to follow-up on weight loss medication, recent toe injury as below.    Toe Injury     Back Pain        History/Other:   Review of Systems   Musculoskeletal:  Positive for back pain.     Current Outpatient Medications   Medication Sig Dispense Refill    polyethylene glycol, PEG 3350, (MIRALAX) 17 GM/SCOOP Oral Powder Take 17 g by mouth daily. In a glass of clear liquid. 765 g 1    Naltrexone-buPROPion HCl ER (CONTRAVE) 8-90 MG Oral Tablet 12 Hr Week 1: 1 tablet in AM      None in PM Week 2: 1 tablet in AM      1 tablet in PM Week 3: 2 tablets in AM    1 tablet in PM Week 4: [place] 2 tablets in AM     2 tablets in PM 70 tablet 0    simvastatin 40 MG Oral Tab Take 1 tablet (40 mg total) by mouth nightly. TAKE ONE TABLET BY MOUTH NIGHTLY 90 tablet 3    diclofenac 50 MG Oral Tab EC Take 1 tablet (50 mg total) by mouth 2 (two) times daily.      tamsulosin (FLOMAX) 0.4 MG Oral Cap Take 1 capsule (0.4 mg total) by mouth daily. 90 capsule 3    Cholecalciferol (VITAMIN D) 50 MCG (2000 UT) Oral Cap Take by mouth.      Zinc Sulfate (ZINC 15 OR) Take by mouth.      Coenzyme Q10 (COQ10) 100 MG Oral Cap Take by mouth.      omeprazole (PRILOSEC) 20 MG Oral Capsule Delayed Release Take 1 capsule by mouth every morning. 90 capsule 3    omega-3 fatty acids (FISH OIL) 1000 MG Oral Cap Take  by mouth.      triamcinolone 0.1 % External Ointment Apply 1 Application topically 2 (two) times daily. 30 g 1     Allergies:No Known Allergies    Objective:   Physical Exam  Constitutional:       Appearance: Normal appearance.   Cardiovascular:      Rate and Rhythm: Normal rate and regular rhythm.      Pulses: Normal pulses.      Heart sounds: Normal heart sounds.   Pulmonary:      Effort: Pulmonary effort is normal.      Breath sounds: Normal breath sounds.   Musculoskeletal:      Comments: Right foot great toe with paronychia erythema.  Fifth toe with ecchymosis  and tenderness proximal phalanx.   Neurological:      Mental Status: He is alert.         Assessment & Plan:   1. Morbid obesity with BMI of 40.0-44.9, adult (HCC)-after last visit patient started Contrave.  He found it was helpful in suppressing appetite.  He reports his weight decreased from 318 pounds to 296 pounds at home.  However, he had severe constipation, requiring ER visit.  At this time plan to restart Contrave, recommend doing it along with Ema Maldonado program which has been successful for him in the past.  Exercising [employer], stationary bicycle.  Start MiraLAX once a day, may increase to twice a day if needed.  If he goes more than 2 days without bowel movement magnesia 2 tablespoons.  Follow-up routine physical in 3 months.   2. Injury of toe on right foot, initial encounter-struck right toe ecchymotic, tender.  No deformity.  Recommend taping to fourth toe for approximately 3 weeks.  Appropriate footwear.  Warm soaks for paronychia inflammation right great toe (mild paronychia no erythema, no fluctuance).   3. Low back pain, unspecified back pain laterality, unspecified chronicity, unspecified whether sciatica present-chronic low back pain, more recently anterior groin pain after heavy lifting.  Finds overall back pain improves if he keeps up with core exercises, will try to focus on this.       No orders of the defined types were placed in this encounter.      Meds This Visit:  Requested Prescriptions     Signed Prescriptions Disp Refills    polyethylene glycol, PEG 3350, (MIRALAX) 17 GM/SCOOP Oral Powder 765 g 1     Sig: Take 17 g by mouth daily. In a glass of clear liquid.    Naltrexone-buPROPion HCl ER (CONTRAVE) 8-90 MG Oral Tablet 12 Hr 70 tablet 0     Sig: Week 1: 1 tablet in AM      None in PM Week 2: 1 tablet in AM      1 tablet in PM Week 3: 2 tablets in AM    1 tablet in PM Week 4: [place] 2 tablets in AM     2 tablets in PM       Imaging & Referrals:  None

## 2024-09-02 ENCOUNTER — PATIENT MESSAGE (OUTPATIENT)
Dept: FAMILY MEDICINE CLINIC | Facility: CLINIC | Age: 59
End: 2024-09-02

## 2024-09-03 RX ORDER — NALTREXONE HYDROCHLORIDE AND BUPROPION HYDROCHLORIDE 8; 90 MG/1; MG/1
2 TABLET, EXTENDED RELEASE ORAL 2 TIMES DAILY
Qty: 120 TABLET | Refills: 1 | Status: SHIPPED | OUTPATIENT
Start: 2024-09-03 | End: 2024-09-04

## 2024-09-03 NOTE — TELEPHONE ENCOUNTER
Medication pended to run thru Protocol       From: Leonardo Overton  To: Lima Rodriguez  Sent: 9/2/2024  8:17 AM CDT  Subject: Prescription    Dr Rodriguez    My monthly subscription of Conclave is up and the [place] [facility] needs to get verification from your office for the next month's dosage. I assume that this will happen every month.    Here is their contact info:  [place] [facility]  [address] [place] [address]  Store Phone [phone]  Store Fax [phone]    I also assume that you are both closed today for the holiday.     Thanks   Chester

## 2024-09-03 NOTE — TELEPHONE ENCOUNTER
Please review. Protocol Failed; No Protocol    Requested Prescriptions   Pending Prescriptions Disp Refills    CONTRAVE 8-90 MG Oral Tablet 12 Hr [Pharmacy Med Name: CONTRAVE ER 8-90 MG TABLET] 70 tablet 0     Sig: Starting month: 1 TAB DAILY 7 Days, THEN 1 TWICE DAILY 7 Days, THEN 2 in the AM AND 1 in the PM 7 Days THEN your maintenance dose is 2 TWICE DAILY       There is no refill protocol information for this order            Future Appointments         Provider Department Appt Notes    In 1 month Lima Rodriguez MD [facility]     In 5 months Zion Bunn PA-C [facility] 1 year check up          Recent Outpatient Visits              1 month ago Morbid obesity with BMI of 40.0-44.9, adult ([facility])    [facility] Lima Rodriguez MD    Office Visit    4 months ago Morbid obesity with BMI of 40.0-44.9, adult ([facility])    [facility] Lima Rodriguez MD    Telemedicine    6 months ago NGOC (obstructive sleep apnea)    [facility] Zion Bunn PA-C    Office Visit    10 months ago     [facility] Gabrielle Car PT    Office Visit    10 months ago NGOC (obstructive sleep apnea)    [facility]    Office Visit

## 2024-09-04 ENCOUNTER — PATIENT MESSAGE (OUTPATIENT)
Dept: FAMILY MEDICINE CLINIC | Facility: CLINIC | Age: 59
End: 2024-09-04

## 2024-09-04 RX ORDER — NALTREXONE HYDROCHLORIDE AND BUPROPION HYDROCHLORIDE 8; 90 MG/1; MG/1
TABLET, EXTENDED RELEASE ORAL
Qty: 70 TABLET | Refills: 0
Start: 2024-09-04 | End: 2024-10-03

## 2024-09-04 RX ORDER — NALTREXONE HYDROCHLORIDE AND BUPROPION HYDROCHLORIDE 8; 90 MG/1; MG/1
2 TABLET, EXTENDED RELEASE ORAL 2 TIMES DAILY
Qty: 360 TABLET | Refills: 0 | Status: SHIPPED | OUTPATIENT
Start: 2024-09-04 | End: 2024-12-03

## 2024-09-04 NOTE — TELEPHONE ENCOUNTER
Let him know I recommend taking 1 tablet twice a day for 7 days, then if tolerating well increase back to 2 tablets twice a day.

## 2024-09-04 NOTE — TELEPHONE ENCOUNTER
Please confirm with patient that he completed the starter dose and is seeking maintenance dose 2 tablets twice a day as this is what I sent.

## 2024-09-04 NOTE — TELEPHONE ENCOUNTER
Advised patient of Dr. Rodriguez's note. Patient verbalized understanding. Wanted to know if doctor can send a 90 days supply of the medication to the pharmacy? Medication pended.

## 2024-09-04 NOTE — TELEPHONE ENCOUNTER
Spoke to patient. He confirmed he takes the two tablets twice a day. He has not had pills since Saturday and will probably not receive this until Friday so wanted to know if that changes any instructions.       Dr. Rodriguez, please advise.

## 2024-09-04 NOTE — TELEPHONE ENCOUNTER
Patient advised.     Pharmacy    [place] [facility], [facility] - [place], [place] - [address] [phone], [phone]        Disp Refills Start End    Naltrexone-buPROPion HCl ER (CONTRAVE) 8-90 MG Oral Tablet 12 Hr 360 tablet 0 9/4/2024 12/3/2024    Sig - Route: Take 2 tablets by mouth in the morning and 2 tablets before bedtime. - Oral    Sent to pharmacy as: Contrave 8-90 MG Oral Tablet Extended Release 12 Hour (Naltrexone-buPROPion HCl ER)    Notes to Pharmacy: NA    E-Prescribing Status: Receipt confirmed by pharmacy (9/4/2024  6:00 PM CDT)

## 2024-09-05 ENCOUNTER — TELEPHONE (OUTPATIENT)
Dept: FAMILY MEDICINE CLINIC | Facility: CLINIC | Age: 59
End: 2024-09-05

## 2024-09-05 NOTE — TELEPHONE ENCOUNTER
Prior authorization initiated for,await 1-5 days for decision   Naltrexone-buPROPion HCl ER (CONTRAVE) 8-90 MG

## 2024-09-05 NOTE — TELEPHONE ENCOUNTER
Naltrexone-buPROPion HCl ER (CONTRAVE) 8-90 MG Oral Tablet 12 Hr, Take 2 tablets by mouth in the morning and 2 tablets before bedtime., Disp: 360 tablet, Rfl: 0    KEY:[id number]  Patient Last Name: Tian  :01/10/1965

## 2024-09-05 NOTE — TELEPHONE ENCOUNTER
Waiting for Payer Response   9/5/2024  2:13 PM  Sending user: Christen Liu CMA   Payer: [facility]   [phone]   [phone]

## 2024-09-06 NOTE — TELEPHONE ENCOUNTER
No further action needed.         MEDICATION RECORD :    Naltrexone-buPROPion HCl ER (CONTRAVE) 8-90 MG Oral Tablet 12 Hr 360 tablet 0 9/4/2024 12/3/2024    Sig - Route: Take 2 tablets by mouth in the morning and 2 tablets before bedtime. - Oral    Sent to pharmacy as: Contrave 8-90 MG Oral Tablet Extended Release 12 Hour (Naltrexone-buPROPion HCl ER)    Notes to Pharmacy: NA    E-Prescribing Status: Receipt confirmed by pharmacy (9/4/2024  6:00 PM CDT)    Prior authorization: Denied      Pharmacy    [facility], [facility] - [place], [place] - [address] [place] [address] [phone], [phone]

## 2024-09-06 NOTE — TELEPHONE ENCOUNTER
From: Leonardo Overton  To: Lima BAUTISTAHunter Jennifer  Sent: 9/4/2024 5:31 PM CDT  Subject: Conclave Prescription    Hi Doc    I spoke with the [facility] today about the stoppage of my supply of Conclave. Though they were going to give me a 90-day supply, your prescriptions are only for 30-day supplies.     Could you send yet another prescription for October's supply so that my regiment is uninterrupted? It would be a big help.    Ortega Briggs

## 2024-09-06 NOTE — TELEPHONE ENCOUNTER
From  Geetha Jacob RN To  Leonardo Overton Sent and Delivered  9/5/2024 10:18 AM   Last Read in MyChart  9/5/2024  2:26 PM by Leonardo Overton

## 2024-09-06 NOTE — TELEPHONE ENCOUNTER
Denied    Note from payer: Your PA request has been denied.  Additional information will be provided in the denial communication. (Message 1148)  Payer: [facility] Marielle[place] Case ID: [id number]    [phone]  Electronic appeal: Not supported  Appeal instructions: Your PA request has been denied.  Additional information will be provided in the denial communication. (Message 1143)  View History

## 2024-09-20 RX ORDER — TAMSULOSIN HYDROCHLORIDE 0.4 MG/1
0.4 CAPSULE ORAL DAILY
Qty: 90 CAPSULE | Refills: 3 | Status: SHIPPED | OUTPATIENT
Start: 2024-09-20 | End: 2025-09-20

## 2024-09-20 NOTE — TELEPHONE ENCOUNTER
Refill Per Protocol     Requested Prescriptions   Pending Prescriptions Disp Refills    TAMSULOSIN 0.4 MG Oral Cap [Pharmacy Med Name: Tamsulosin Hydrochloride 0.4 Mg Cap Nort] 90 capsule 0     Sig: Take 1 capsule (0.4 mg total) by mouth daily.       Genitourinary Medications Passed - 9/17/2024  3:00 AM        Passed - Patient does not have pulmonary hypertension on problem list        Passed - In person appointment or virtual visit in the past 12 mos or appointment in next 3 mos     Recent Outpatient Visits              1 month ago Morbid obesity with BMI of 40.0-44.9, adult ([facility]    4 months ago Morbid obesity with BMI of 40.0-44.9, adult ([facility])    [facility]    [facility]    7 months ago NGOC (obstructive sleep apnea)    [facility] Zion Bunn PA-C    Office Visit    11 months ago     [facility] Gabrielle Car PT    Office Visit    11 months ago NGOC (obstructive sleep apnea)    [facility]    Office Visit          Future Appointments         Provider Department Appt Notes    In 1 month Lima Rodriguez MD [facility] px, last px: 09/28/23    In 4 months Zion Bunn PA-C [facility] 1 year check up                           Future Appointments         Provider Department Appt Notes    In 1 month Lima Rodriguez MD [facility] px, last px: 09/28/23    In 4 months Zion Bunn PA-C [facility] 1 year check up          Recent Outpatient Visits              1 month ago Morbid obesity with BMI of 40.0-44.9, adult ([facility])    [facility]  Lima Khan MD    Office Visit    4 months ago Morbid obesity with BMI of 40.0-44.9, adult (HCC)    [facility] Lima Rodriguez MD    Telemedicine    7 months ago NGOC (obstructive sleep apnea)    [facility] Zion Bunn PA-C    Office Visit    11 months ago     [facility] Gabrielle Car PT    Office Visit    11 months ago NGOC (obstructive sleep apnea)    [facility]    Office Visit

## 2024-10-01 ENCOUNTER — PATIENT MESSAGE (OUTPATIENT)
Dept: FAMILY MEDICINE CLINIC | Facility: CLINIC | Age: 59
End: 2024-10-01

## 2024-10-01 DIAGNOSIS — M79.674 CHRONIC TOE PAIN, BILATERAL: Primary | ICD-10-CM

## 2024-10-01 DIAGNOSIS — G89.29 CHRONIC TOE PAIN, BILATERAL: Primary | ICD-10-CM

## 2024-10-01 DIAGNOSIS — M79.675 CHRONIC TOE PAIN, BILATERAL: Primary | ICD-10-CM

## 2024-10-02 NOTE — TELEPHONE ENCOUNTER
From: Leonardo Overton  To: Lima Rodriguez  Sent: 10/1/2024 1:26 PM CDT  Subject: Podiatrist    Dr. Rodriguez    I have an appointment to see Dr. Bernda Wade today at 4:15 at the [facility], [address] [place]. [address] [phone] ext [phone].    My HMO requires a referral letter for any appointments.    Is it possible to send one to them this afternoon before 4pm, or send it to me so that I can bring it with? Otherwise I will have to wait over a week for another appointment. Please let me know.    Thanks, Chester Overton

## 2024-10-09 NOTE — TELEPHONE ENCOUNTER
MANAGED CARE===see message, please assist and update the patient .       PODIATRIST referral 10/3/24;  Type Date User Summary Attachment   Prior Authorization Pursuit 10/08/2024 11:20 AM Olivia Scott Referral Faxed to [phone] -     Note:  Specialist is showing out of network with members health plan. Unable to process referral request through [employer].

## 2024-10-22 ENCOUNTER — PATIENT MESSAGE (OUTPATIENT)
Dept: FAMILY MEDICINE CLINIC | Facility: CLINIC | Age: 59
End: 2024-10-22

## 2024-10-23 RX ORDER — NALTREXONE HYDROCHLORIDE AND BUPROPION HYDROCHLORIDE 8; 90 MG/1; MG/1
TABLET, EXTENDED RELEASE ORAL
Qty: 70 TABLET | Refills: 0 | OUTPATIENT
Start: 2024-10-23

## 2024-10-26 RX ORDER — NALTREXONE HYDROCHLORIDE AND BUPROPION HYDROCHLORIDE 8; 90 MG/1; MG/1
TABLET, EXTENDED RELEASE ORAL
Qty: 70 TABLET | Refills: 0 | OUTPATIENT
Start: 2024-10-26

## 2024-10-29 RX ORDER — SIMVASTATIN 40 MG
40 TABLET ORAL NIGHTLY
Qty: 90 TABLET | Refills: 3 | Status: SHIPPED | OUTPATIENT
Start: 2024-10-29

## 2024-10-29 RX ORDER — NALTREXONE HYDROCHLORIDE AND BUPROPION HYDROCHLORIDE 8; 90 MG/1; MG/1
TABLET, EXTENDED RELEASE ORAL
Qty: 70 TABLET | Refills: 0 | OUTPATIENT
Start: 2024-10-29

## 2024-10-29 NOTE — TELEPHONE ENCOUNTER
Please Review. Protocol Failed; No Protocol   Failed - ALT < 80   Lab Results   Component Value Date    ALT 50 (H) 06/01/2023          Requested Prescriptions   Pending Prescriptions Disp Refills    simvastatin 40 MG Oral Tab 90 tablet 3     Sig: Take 1 tablet (40 mg total) by mouth nightly. TAKE ONE TABLET BY MOUTH NIGHTLY       Cholesterol Medication Protocol Failed - 10/29/2024 12:39 PM        Failed - ALT < 80     Lab Results   Component Value Date    ALT 50 (H) 06/01/2023             Failed - ALT resulted within past year        Failed - Lipid panel within past 12 months     Lab Results   Component Value Date    CHOLEST 174 09/28/2023    TRIG 193 (H) 09/28/2023    HDL 47 09/28/2023    LDL 98 09/28/2023    VLDL 21 01/13/2021    TCHDLRATIO 3.7 09/28/2023    NONHDLC 127 09/28/2023             Passed - In person appointment or virtual visit in the past 12 mos or appointment in next 3 mos     Recent Outpatient Visits              3 months ago Morbid obesity with BMI of 40.0-44.9, adult ([facility])    [facility] Lima Rodriguez MD    Office Visit    6 months ago Morbid obesity with BMI of 40.0-44.9, adult ([facility])    [facility] Lima Rodriguez MD    Telemedicine    8 months ago NGOC (obstructive sleep apnea)    [facility] Zion Bunn PA-C    Office Visit    1 year ago     [facility] Gabrielle Car PT    Office Visit    1 year ago NGOC (obstructive sleep apnea)    [facility]    Office Visit          Future Appointments         Provider Department Appt Notes    In 1 week Lima Rodriguez MD [facility] px, last px: 09/28/23    In 3 months Zion Bunn PA-C [facility] 1 year check up                           Future Appointments          Provider Department Appt Notes    In 1 week Lima Rodriguez MD [facility] px, last px: 09/28/23    In 3 months Zion Bunn PA-C [facility] 1 year check up          Recent Outpatient Visits              3 months ago Morbid obesity with BMI of 40.0-44.9, adult ([facility])    [facility] Lima Rodriguez MD    Office Visit    6 months ago Morbid obesity with BMI of 40.0-44.9, adult ([facility])    [facility] Lima Rodriguez MD    Telemedicine    8 months ago NGOC (obstructive sleep apnea)    [facility] Zion Bunn PA-C    Office Visit    1 year ago     [facility] Gabrielle Car PT    Office Visit    1 year ago NGOC (obstructive sleep apnea)    [facility]    Office Visit

## 2024-11-01 RX ORDER — NALTREXONE HYDROCHLORIDE AND BUPROPION HYDROCHLORIDE 8; 90 MG/1; MG/1
TABLET, EXTENDED RELEASE ORAL
Qty: 70 TABLET | Refills: 0 | OUTPATIENT
Start: 2024-11-01

## 2024-11-01 NOTE — TELEPHONE ENCOUNTER
Contrave 8-90m day supply sent to [facility] on 2024-verified with pharmacy they do have prescription on file.

## 2024-11-02 ENCOUNTER — TELEPHONE (OUTPATIENT)
Dept: FAMILY MEDICINE CLINIC | Facility: CLINIC | Age: 59
End: 2024-11-02

## 2024-11-02 NOTE — TELEPHONE ENCOUNTER
Naltrexone-buPROPion HCl ER (CONTRAVE) 8-90 MG Oral Tablet 12 Hr, Take 2 tablets by mouth in the morning and 2 tablets before bedtime., Disp: 360 tablet, Rfl: 0    kEY:[id number]  Patient Last Name: Tian  :01/10/1965

## 2024-11-08 ENCOUNTER — OFFICE VISIT (OUTPATIENT)
Dept: FAMILY MEDICINE CLINIC | Facility: CLINIC | Age: 59
End: 2024-11-08
Payer: COMMERCIAL

## 2024-11-08 VITALS
HEART RATE: 90 BPM | TEMPERATURE: 97 F | WEIGHT: 289 LBS | OXYGEN SATURATION: 94 % | SYSTOLIC BLOOD PRESSURE: 108 MMHG | DIASTOLIC BLOOD PRESSURE: 73 MMHG | BODY MASS INDEX: 40 KG/M2

## 2024-11-08 DIAGNOSIS — E78.00 HYPERCHOLESTEROLEMIA: ICD-10-CM

## 2024-11-08 DIAGNOSIS — G47.30 SLEEP APNEA, UNSPECIFIED TYPE: ICD-10-CM

## 2024-11-08 DIAGNOSIS — K76.0 HEPATIC STEATOSIS: ICD-10-CM

## 2024-11-08 DIAGNOSIS — E66.01 CLASS 3 SEVERE OBESITY DUE TO EXCESS CALORIES WITH SERIOUS COMORBIDITY AND BODY MASS INDEX (BMI) OF 40.0 TO 44.9 IN ADULT (HCC): ICD-10-CM

## 2024-11-08 DIAGNOSIS — K74.00 LIVER FIBROSIS: ICD-10-CM

## 2024-11-08 DIAGNOSIS — Z00.00 ROUTINE PHYSICAL EXAMINATION: Primary | ICD-10-CM

## 2024-11-08 DIAGNOSIS — E66.813 CLASS 3 SEVERE OBESITY DUE TO EXCESS CALORIES WITH SERIOUS COMORBIDITY AND BODY MASS INDEX (BMI) OF 40.0 TO 44.9 IN ADULT (HCC): ICD-10-CM

## 2024-11-08 DIAGNOSIS — Z80.42 FAMILY HISTORY OF PROSTATE CANCER: ICD-10-CM

## 2024-11-08 LAB
AFP-TM SERPL-MCNC: 2.2 NG/ML
ALBUMIN SERPL-MCNC: 5.1 G/DL (ref 3.2–4.8)
ALBUMIN/GLOB SERPL: 2 {RATIO} (ref 1–2)
ALP LIVER SERPL-CCNC: 99 U/L
ALT SERPL-CCNC: 20 U/L
ANION GAP SERPL CALC-SCNC: 9 MMOL/L (ref 0–18)
AST SERPL-CCNC: 23 U/L (ref ?–34)
BILIRUB SERPL-MCNC: 0.6 MG/DL (ref 0.2–1.1)
BUN BLD-MCNC: 13 MG/DL (ref 9–23)
BUN/CREAT SERPL: 14.6 (ref 10–20)
CALCIUM BLD-MCNC: 10.7 MG/DL (ref 8.7–10.4)
CHLORIDE SERPL-SCNC: 107 MMOL/L (ref 98–112)
CHOLEST SERPL-MCNC: 177 MG/DL (ref ?–200)
CO2 SERPL-SCNC: 27 MMOL/L (ref 21–32)
COMPLEXED PSA SERPL-MCNC: 2.01 NG/ML (ref ?–4)
CREAT BLD-MCNC: 0.89 MG/DL
DEPRECATED RDW RBC AUTO: 41.2 FL (ref 35.1–46.3)
EGFRCR SERPLBLD CKD-EPI 2021: 97 ML/MIN/1.73M2 (ref 60–?)
ERYTHROCYTE [DISTWIDTH] IN BLOOD BY AUTOMATED COUNT: 12.7 % (ref 11–15)
EST. AVERAGE GLUCOSE BLD GHB EST-MCNC: 114 MG/DL (ref 68–126)
FASTING PATIENT LIPID ANSWER: NO
FASTING STATUS PATIENT QL REPORTED: NO
GLOBULIN PLAS-MCNC: 2.5 G/DL (ref 2–3.5)
GLUCOSE BLD-MCNC: 90 MG/DL (ref 70–99)
HBA1C MFR BLD: 5.6 % (ref ?–5.7)
HCT VFR BLD AUTO: 45.3 %
HDLC SERPL-MCNC: 44 MG/DL (ref 40–59)
HGB BLD-MCNC: 15.6 G/DL
LDLC SERPL CALC-MCNC: 112 MG/DL (ref ?–100)
MCH RBC QN AUTO: 30.9 PG (ref 26–34)
MCHC RBC AUTO-ENTMCNC: 34.4 G/DL (ref 31–37)
MCV RBC AUTO: 89.7 FL
NONHDLC SERPL-MCNC: 133 MG/DL (ref ?–130)
OSMOLALITY SERPL CALC.SUM OF ELEC: 296 MOSM/KG (ref 275–295)
PLATELET # BLD AUTO: 266 10(3)UL (ref 150–450)
POTASSIUM SERPL-SCNC: 4.4 MMOL/L (ref 3.5–5.1)
PROT SERPL-MCNC: 7.6 G/DL (ref 5.7–8.2)
RBC # BLD AUTO: 5.05 X10(6)UL
SODIUM SERPL-SCNC: 143 MMOL/L (ref 136–145)
TRIGL SERPL-MCNC: 119 MG/DL (ref 30–149)
VLDLC SERPL CALC-MCNC: 20 MG/DL (ref 0–30)
WBC # BLD AUTO: 10.5 X10(3) UL (ref 4–11)

## 2024-11-08 PROCEDURE — 90656 IIV3 VACC NO PRSV 0.5 ML IM: CPT | Performed by: FAMILY MEDICINE

## 2024-11-08 PROCEDURE — 99396 PREV VISIT EST AGE 40-64: CPT | Performed by: FAMILY MEDICINE

## 2024-11-08 PROCEDURE — 90471 IMMUNIZATION ADMIN: CPT | Performed by: FAMILY MEDICINE

## 2024-11-08 NOTE — PROGRESS NOTES
Subjective:   Patient ID: Leonardo Overton is a 59 year old male.    Patient presents for routine physical and issues as below.        History/Other:   Review of Systems   Constitutional: Negative.    HENT:          Dry mouth   Respiratory: Negative.     Cardiovascular: Negative.    Gastrointestinal:  Positive for constipation.   Skin: Negative.    Neurological: Negative.      Current Outpatient Medications   Medication Sig Dispense Refill    magnesium hydroxide (MILK OF MAGNESIA) 400 MG/5ML Oral Suspension 1-2 tbsp by mouth as needed      simvastatin 40 MG Oral Tab Take 1 tablet (40 mg total) by mouth nightly. TAKE ONE TABLET BY MOUTH NIGHTLY 90 tablet 3    tamsulosin 0.4 MG Oral Cap Take 1 capsule (0.4 mg total) by mouth daily. 90 capsule 3    Naltrexone-buPROPion HCl ER (CONTRAVE) 8-90 MG Oral Tablet 12 Hr Take 2 tablets by mouth in the morning and 2 tablets before bedtime. 360 tablet 0    polyethylene glycol, PEG 3350, (MIRALAX) 17 GM/SCOOP Oral Powder Take 17 g by mouth daily. In a glass of clear liquid. 765 g 1    Cholecalciferol (VITAMIN D) 50 MCG (2000 UT) Oral Cap Take by mouth.      Zinc Sulfate (ZINC 15 OR) Take by mouth.      Coenzyme Q10 (COQ10) 100 MG Oral Cap Take by mouth.      omeprazole (PRILOSEC) 20 MG Oral Capsule Delayed Release Take 1 capsule by mouth every morning. 90 capsule 3    omega-3 fatty acids (FISH OIL) 1000 MG Oral Cap Take  by mouth.      triamcinolone 0.1 % External Ointment Apply 1 Application topically 2 (two) times daily. (Patient not taking: Reported on 11/8/2024) 30 g 1     Allergies:Allergies[1]    Objective:   Physical Exam  Constitutional:       Appearance: He is well-developed. He is obese.   Cardiovascular:      Rate and Rhythm: Normal rate and regular rhythm.      Heart sounds: Normal heart sounds.   Pulmonary:      Effort: Pulmonary effort is normal.      Breath sounds: Normal breath sounds.   Abdominal:      General: Bowel sounds are normal.      Palpations: Abdomen is  soft.   Skin:     General: Skin is warm and dry.   Neurological:      Mental Status: He is alert and oriented to person, place, and time.      Deep Tendon Reflexes: Reflexes are normal and symmetric.         Assessment & Plan:   1. Routine physical examination-patient is single, continuing to work full-time.  Encourage regular exercise.  Colon cancer screening up-to-date  Labs done today  Flu vaccine given   2. Class 3 severe obesity due to excess calories with serious comorbidity and body mass index (BMI) of 40.0 to 44.9 in adult (HCC)-since last visit he has been using Contrave and Nutrisystem meals.  He has lost 20 pounds and overall feels significant benefit.  He has noted dry mouth especially in the morning, and increased constipation.  Using MiraLAX daily but not always effective.  Recommend using MiraLAX daily but adding milk of magnesia 1 to 2 tablespoons if he has a day without bowel movement.  He does not notice much appetite suppression as he did expected with Contrave.  Since he is having success with weight loss on current plan, continue for the time being.  However if plateauing we discussed pros and cons of Zepbound.  Will follow-up in 2 months and monitor success.   3. Hypercholesterolemia-check lipid profile today   4. Hepatic steatosis-has seen hepatology in the past, moderate to severe fibrosis.  Monitor LFTs, AFP done today.   5. Family history of prostate cancer-PSA done today   6. Sleep apnea, unspecified type-using AutoPap prescribed by pulmonology.  Tolerating well, has noted benefit.       Orders Placed This Encounter   Procedures    Lipid Panel [E]    CBC, Platelet, No Differential [E]    Comp Metabolic Panel (14) [E]    PSA (Screening) [E]    Hemoglobin A1C    INFLUENZA VACCINE, TRI, PRESERV FREE, 0.5 ML       Meds This Visit:  Requested Prescriptions      No prescriptions requested or ordered in this encounter       Imaging & Referrals:  INFLUENZA VACCINE, TRI, PRESERV FREE, 0.5 ML          [1] No Known Allergies

## 2024-12-03 RX ORDER — NALTREXONE HYDROCHLORIDE AND BUPROPION HYDROCHLORIDE 8; 90 MG/1; MG/1
2 TABLET, EXTENDED RELEASE ORAL 2 TIMES DAILY
Qty: 360 TABLET | Refills: 0 | Status: SHIPPED | OUTPATIENT
Start: 2024-12-03 | End: 2025-01-06

## 2024-12-03 NOTE — TELEPHONE ENCOUNTER
Please review. Protocol Failed; No Protocol    Requested Prescriptions   Pending Prescriptions Disp Refills    CONTRAVE 8-90 MG Oral Tablet 12 Hr [Pharmacy Med Name: CONTRAVE ER 8-90 MG TABLET] 360 tablet 0     Sig: Take 2 tablets by mouth in the morning and 2 tablets before bedtime.       There is no refill protocol information for this order            Future Appointments         Provider Department Appt Notes    In 1 month Lima Rodriguez MD [facility] 2 month f/u    In 2 months Zion Bunn PA-C [facility] 1 year check up          Recent Outpatient Visits              3 weeks ago Routine physical examination    [facility] Lima Rodriguez MD    Office Visit    4 months ago Morbid obesity with BMI of 40.0-44.9, adult ([facility])    [facility] Lima Rodriguez MD    Office Visit    7 months ago Morbid obesity with BMI of 40.0-44.9, adult ([facility])    [facility] AlvinLima MD    Telemedicine    9 months ago NGOC (obstructive sleep apnea)    [facility] Zion Bunn PA-C    Office Visit    1 year ago     [facility] Gabrielle Car PT    Office Visit

## 2024-12-18 RX ORDER — NALTREXONE HYDROCHLORIDE AND BUPROPION HYDROCHLORIDE 8; 90 MG/1; MG/1
2 TABLET, EXTENDED RELEASE ORAL 2 TIMES DAILY
Qty: 360 TABLET | Refills: 0 | OUTPATIENT
Start: 2024-12-18 | End: 2025-03-18

## 2024-12-18 NOTE — TELEPHONE ENCOUNTER
Contrave 8-90m day supply sent to [facility] on 2024- called [facility]- verified they did receive script that was sent on 2024

## 2024-12-19 ENCOUNTER — TELEPHONE (OUTPATIENT)
Dept: FAMILY MEDICINE CLINIC | Facility: CLINIC | Age: 59
End: 2024-12-19

## 2024-12-19 NOTE — TELEPHONE ENCOUNTER
Naltrexone-buPROPion HCl ER (CONTRAVE) 8-90 MG Oral Tablet 12 Hr, Take 2 tablets by mouth in the morning and 2 tablets before bedtime., Disp: 360 tablet, Rfl: 0    Key: [id number]  Patient Last Name: Tian  : 01/10/1965

## 2024-12-24 RX ORDER — SIMVASTATIN 40 MG
40 TABLET ORAL NIGHTLY
Qty: 90 TABLET | Refills: 3 | OUTPATIENT
Start: 2024-12-24

## 2024-12-24 NOTE — TELEPHONE ENCOUNTER
Simvastatin 40m year supply sent to [facility] in [place] on [facility] on 10/29/2024

## 2024-12-30 RX ORDER — SIMVASTATIN 40 MG
40 TABLET ORAL NIGHTLY
Qty: 90 TABLET | Refills: 3 | OUTPATIENT
Start: 2024-12-30

## 2024-12-30 NOTE — TELEPHONE ENCOUNTER
Outpatient Medication Detail     Disp Refills Start End    simvastatin 40 MG Oral Tab 90 tablet 3 10/29/2024 --    Sig - Route: Take 1 tablet (40 mg total) by mouth nightly. TAKE ONE TABLET BY MOUTH NIGHTLY - Oral    Sent to pharmacy as: Simvastatin 40 MG Oral Tablet (Zocor)    E-Prescribing Status: Receipt confirmed by pharmacy (10/29/2024 12:40 PM CDT)      Pharmacy    [id number] - [address] [phone], [phone]

## 2025-01-06 ENCOUNTER — OFFICE VISIT (OUTPATIENT)
Dept: FAMILY MEDICINE CLINIC | Facility: CLINIC | Age: 60
End: 2025-01-06
Payer: COMMERCIAL

## 2025-01-06 ENCOUNTER — PATIENT MESSAGE (OUTPATIENT)
Dept: FAMILY MEDICINE CLINIC | Facility: CLINIC | Age: 60
End: 2025-01-06

## 2025-01-06 VITALS
BODY MASS INDEX: 40 KG/M2 | OXYGEN SATURATION: 93 % | SYSTOLIC BLOOD PRESSURE: 129 MMHG | HEART RATE: 92 BPM | WEIGHT: 283 LBS | DIASTOLIC BLOOD PRESSURE: 79 MMHG

## 2025-01-06 DIAGNOSIS — K76.0 HEPATIC STEATOSIS: ICD-10-CM

## 2025-01-06 DIAGNOSIS — G47.30 SLEEP APNEA, UNSPECIFIED TYPE: ICD-10-CM

## 2025-01-06 DIAGNOSIS — E78.00 HYPERCHOLESTEROLEMIA: ICD-10-CM

## 2025-01-06 DIAGNOSIS — E66.01 SEVERE OBESITY (BMI 35.0-39.9) WITH COMORBIDITY (HCC): Primary | ICD-10-CM

## 2025-01-06 PROCEDURE — 99213 OFFICE O/P EST LOW 20 MIN: CPT | Performed by: FAMILY MEDICINE

## 2025-01-06 RX ORDER — SIMVASTATIN 40 MG
40 TABLET ORAL NIGHTLY
Qty: 90 TABLET | Refills: 3 | Status: SHIPPED | OUTPATIENT
Start: 2025-01-06

## 2025-01-06 RX ORDER — TIRZEPATIDE 2.5 MG/.5ML
2.5 INJECTION, SOLUTION SUBCUTANEOUS WEEKLY
Qty: 2 ML | Refills: 0 | Status: SHIPPED | OUTPATIENT
Start: 2025-01-06 | End: 2025-01-28

## 2025-01-06 NOTE — PROGRESS NOTES
Subjective:   Patient ID: Leonardo Overton is a 59 year old male.    Patient presents to follow-up on medications for weight loss, hyperlipidemia and issues as below.    Medication Request  This is a chronic problem. The current episode started more than 1 month ago. The problem occurs daily.       History/Other:   Review of Systems  Current Outpatient Medications   Medication Sig Dispense Refill    Tirzepatide-Weight Management (ZEPBOUND) 2.5 MG/0.5ML Subcutaneous Solution Auto-injector Inject 2.5 mg into the skin once a week for 4 doses. 2 mL 0    simvastatin 40 MG Oral Tab Take 1 tablet (40 mg total) by mouth nightly. TAKE ONE TABLET BY MOUTH NIGHTLY 90 tablet 3    magnesium hydroxide (MILK OF MAGNESIA) 400 MG/5ML Oral Suspension 1-2 tbsp by mouth as needed      tamsulosin 0.4 MG Oral Cap Take 1 capsule (0.4 mg total) by mouth daily. 90 capsule 3    polyethylene glycol, PEG 3350, (MIRALAX) 17 GM/SCOOP Oral Powder Take 17 g by mouth daily. In a glass of clear liquid. 765 g 1    Cholecalciferol (VITAMIN D) 50 MCG (2000 UT) Oral Cap Take by mouth.      Zinc Sulfate (ZINC 15 OR) Take by mouth.      Coenzyme Q10 (COQ10) 100 MG Oral Cap Take by mouth.      omeprazole (PRILOSEC) 20 MG Oral Capsule Delayed Release Take 1 capsule by mouth every morning. 90 capsule 3    omega-3 fatty acids (FISH OIL) 1000 MG Oral Cap Take  by mouth.       Allergies:Allergies[1]    Objective:   Physical Exam  Constitutional:       Appearance: Normal appearance.   Cardiovascular:      Rate and Rhythm: Normal rate and regular rhythm.      Pulses: Normal pulses.      Heart sounds: Normal heart sounds.   Pulmonary:      Effort: Pulmonary effort is normal.      Breath sounds: Normal breath sounds.   Musculoskeletal:      Comments: Trace bilateral ankle edema left greater than right   Neurological:      Mental Status: He is alert.         Assessment & Plan:   1. Severe obesity (BMI 35.0-39.9) with comorbidity (HCC)-patient has lost 35 pounds with  Contrave and Ema Maldonado.  However he reports he is not experiencing much appetite suppression from Contrave.  In addition he has been having more difficulty sleeping.  He is spending $700 a month on this combination of weight loss methods.  Discussed discontinuing Contrave changing to Zepbound as it is likely to provide more appetite suppression, with good Rx coupon it may be less expensive per month, and it may have benefits for hepatic steatosis, NGOC.  He understands system for Ema Maldonado and will be able to simulate with healthy choice meals and smoothies.  Encouraged regular exercise  He has 1 more month supply of Contrave then we will start Zepbound 2.5 mg weekly.  He will contact us after 1 month of use for Zepbound 5 mg dose.   2. Sleep apnea, unspecified type-using CPAP, but unable to sleep more than 4 hours per night.  Experiencing afternoon somnolence.  Monitor for improvement with medication change above.     3. Hepatic steatosis-reviewed labs from last visit.  Still elevated transaminase but improving.   4. Hypercholesterolemia-refill for simvastatin sent to pharmacy.       No orders of the defined types were placed in this encounter.      Meds This Visit:  Requested Prescriptions     Signed Prescriptions Disp Refills    Tirzepatide-Weight Management (ZEPBOUND) 2.5 MG/0.5ML Subcutaneous Solution Auto-injector 2 mL 0     Sig: Inject 2.5 mg into the skin once a week for 4 doses.    simvastatin 40 MG Oral Tab 90 tablet 3     Sig: Take 1 tablet (40 mg total) by mouth nightly. TAKE ONE TABLET BY MOUTH NIGHTLY       Imaging & Referrals:  None         [1] No Known Allergies

## 2025-04-15 ENCOUNTER — OFFICE VISIT (OUTPATIENT)
Dept: PULMONOLOGY | Facility: CLINIC | Age: 60
End: 2025-04-15
Payer: COMMERCIAL

## 2025-04-15 VITALS
OXYGEN SATURATION: 96 % | SYSTOLIC BLOOD PRESSURE: 117 MMHG | HEART RATE: 75 BPM | BODY MASS INDEX: 41.12 KG/M2 | WEIGHT: 287.19 LBS | HEIGHT: 70 IN | RESPIRATION RATE: 12 BRPM | DIASTOLIC BLOOD PRESSURE: 71 MMHG

## 2025-04-15 DIAGNOSIS — G47.33 OSA (OBSTRUCTIVE SLEEP APNEA): Primary | ICD-10-CM

## 2025-04-15 PROCEDURE — 99213 OFFICE O/P EST LOW 20 MIN: CPT | Performed by: PHYSICIAN ASSISTANT

## 2025-04-15 NOTE — PROGRESS NOTES
Pulmonary Progress Note    History of Present Illness:  Leonardo Overton is a 60 year old male presenting to pulmonary clinic today for follow-up of NGOC. He has been traveling more frequently and thus CPAP use is lower. He states he is consistent with CPAP use when he is at home but unfortunately has difficulty keeping mask on for more than 4 hours. He has significant dry mouth despite humidity function. He uses full face mask and does not appreciate mask leaks. He sleeps with mouth open and is interested in trying a chinstrap. Data download demonstrates average daily usage of 3 hours and 6 minutes with residual respiratory events of 0.3/h on APAP 8-18 CWP. When he is able to keep CPAP on for 6 or more hours, his sleep is refreshing and daytime sleepiness is improved.    Review of Systems:   Constitutional: +Intentional weight loss of 30 pounds in 5 months.  HEENT: No congestion or postnasal drip.  Cardio: No chest pain.  Respiratory: No shortness of breath.  GI: No acid reflux.  Extremities: No lower extremity swelling or pain.   Neurologic: No headache.  Psych: No depression.     Physical Exam:  /71   Pulse 75   Resp 12   Ht 5' 10\" (1.778 m)   Wt 287 lb 3.2 oz (130.3 kg)   SpO2 96%   BMI 41.21 kg/m²    Constitutional: Obese. No acute distress.  HEENT: Head NC/AT. PEERL. Crowded oropharynx. Mallampati class 4.  Cardio: Regular rate and rhythm. Normal S1 and S2. No murmurs.   Respiratory: Thorax symmetrical with no labored breathing. Clear to ausculation bilaterally with symmetrical breath sounds. No wheezing, rhonchi, or crackles.   Extremities: No clubbing or cyanosis. Bilateral LE edema.  Neurologic: A&Ox3. No gross motor deficits.  Skin: Warm, dry.  Psych: Pleasant affect. Cooperative.    Assessment/Plan:  Severe NGOC - Data download demonstrates excellent efficacy. He simply needs to use CPAP more. Usage is lower as of recent due to travel. He may benefit from chinstrap.  Plan:  -Order for  chinstrap  -Vigilance with CPAP every night all night  -Weight loss  -Avoid alcohol  -Avoid sedating drugs  -Never drive if sleepy  -Follow-up at the 1-year interval again with download of data  -Call if new problems in the interim    Zion Bunn PA-C  Pulmonary Medicine  4/15/2025

## 2025-04-15 NOTE — PATIENT INSTRUCTIONS
CPAP replacement supplies  Every month  Mask cushions and/or nasal pillows  CPAP machine filters    Every 3 months  Mask frame (not including the headgear)  CPAP tubing    Every 6 months  Mask headgear  Chin strap (if applicable)  Humidifier water tub

## 2025-04-16 ENCOUNTER — OFFICE VISIT (OUTPATIENT)
Dept: FAMILY MEDICINE CLINIC | Facility: CLINIC | Age: 60
End: 2025-04-16
Payer: COMMERCIAL

## 2025-04-16 VITALS
HEART RATE: 85 BPM | SYSTOLIC BLOOD PRESSURE: 120 MMHG | DIASTOLIC BLOOD PRESSURE: 74 MMHG | WEIGHT: 285 LBS | OXYGEN SATURATION: 96 % | BODY MASS INDEX: 41 KG/M2

## 2025-04-16 DIAGNOSIS — K76.0 HEPATIC STEATOSIS: ICD-10-CM

## 2025-04-16 DIAGNOSIS — G47.30 SLEEP APNEA, UNSPECIFIED TYPE: ICD-10-CM

## 2025-04-16 DIAGNOSIS — E66.01 MORBID OBESITY WITH BMI OF 40.0-44.9, ADULT (HCC): Primary | ICD-10-CM

## 2025-04-16 PROCEDURE — 99213 OFFICE O/P EST LOW 20 MIN: CPT | Performed by: FAMILY MEDICINE

## 2025-04-16 RX ORDER — TIRZEPATIDE 2.5 MG/.5ML
2.5 INJECTION, SOLUTION SUBCUTANEOUS WEEKLY
Qty: 2 ML | Refills: 0 | Status: SHIPPED | OUTPATIENT
Start: 2025-04-16

## 2025-04-16 NOTE — PROGRESS NOTES
The following individual(s) verbally consented to be recorded using ambient AI listening technology and understand that they can each withdraw their consent to this listening technology at any point by asking the clinician to turn off or pause the recording:    Patient name: Leonardo LILY Overton  Additional names:

## 2025-04-16 NOTE — PROGRESS NOTES
Subjective:   Leonardo Overton is a 60 year old female who presents for Follow - Up (3 month F/u on zepbound. Pt has not been taking zepbound due to pricing. ) and Dme/cpap Update (Pt states his hours for the cpap have reduced which is causing him to feel fatigue. )       History/Other:   History of Present Illness  The patient, with a history of obesity and sleep apnea, presents with ongoing challenges in his weight loss journey. He has been on Contrave and has considered switching to Zepbound, but found the latter too expensive. He has also tried meal delivery services like [employer], but discontinued due to dissatisfaction with the taste. The patient reports knee pain, which he attributes to extensive travel and walking in recent times. He also expresses fatigue, despite using a CPAP machine for his sleep apnea. The patient has been considering joining a gym but has been deterred by his fatigue. He expresses a desire for increased energy and stamina.      Chief Complaint Reviewed and Verified  Nursing Notes Reviewed and   Verified  Tobacco Reviewed  Allergies Reviewed  Medications Reviewed    Problem List Reviewed         Tobacco:  He has never smoked tobacco.    Current Medications[1]           Review of Systems:  See above      Objective:   /74   Pulse 85   Wt 285 lb (129.3 kg)   SpO2 96%   BMI 40.89 kg/m²    Estimated body mass index is 40.89 kg/m² as calculated from the following:    Height as of 4/15/25: 5' 10\" (1.778 m).    Weight as of this encounter: 285 lb (129.3 kg).  Results  RADIOLOGY  No results found.       Physical Exam  Physical Exam  GENERAL: No acute distress, well developed, well nourished.  CHEST: Lungs clear to auscultation bilaterally.  CARDIOVASCULAR: Regular rate and rhythm, normal heart sounds.  EXTREMITIES: 1+ nonpitting edema in ankles.      Assessment & Plan:   1. Morbid obesity with BMI of 40.0-44.9, adult (HCC) (Primary)  2. Sleep apnea, unspecified  type  Overview:  12/2023-started CPAP, followed by pulmonary  3. Hepatic steatosis  Overview:  2020 Dr Chao-moderate to severe fibrosis.  HCC screening every 6 months.  Other orders  -     Zepbound; Inject 2.5 mg into the skin once a week.  Dispense: 2 mL; Refill: 0      Assessment & Plan  Assessment & Plan  Severe obesity  Managing with Contrave. Zepbound considered Emphasized protein intake and muscle mass maintenance. Discussed instructions and side effects.  - Continue Contrave until current supply is used up.  - Consider Zepbound if affordable.  Rx sent to [facility] for Zepbound 2.5 mg weekly.  He will contact us in 4 weeks for new dose if tolerating well.  Follow-up appointment here in 2 months.  - Emphasize 70g protein intake daily.  - Encourage exercise.  - Consider virtual nutrition class for GLP medications.    Sleep apnea  Uses CPAP. Reports dry mouth possibly from CPAP. Weight loss may improve symptoms.  - Continue CPAP use.    Constipation  Uses Miralax and milk of magnesia. Miralax ineffective, milk of magnesia effective but causes urgency.  - Continue Miralax and milk of magnesia as needed.  - Monitor effectiveness and adjust as necessary.            Return in about 2 months (around 6/16/2025) for medication, call in 4 wk for new dose.      Lima Rodriguez MD       Subjective:   Leonardo Overton is a 60 year old female who presents for Follow - Up (3 month F/u on zepbound. Pt has not been taking zepbound due to pricing. ) and Dme/cpap Update (Pt states his hours for the cpap have reduced which is causing him to feel fatigue. )       History/Other:   History of Present Illness  The patient, with a history of obesity and sleep apnea, presents with ongoing challenges in his weight loss journey. He has been on Contrave and has considered switching to Zepbar, but found the latter too expensive. He has also tried meal delivery services like [employer], but discontinued due to dissatisfaction with the  taste. The patient reports knee pain, which he attributes to extensive travel and walking in recent times. He also expresses fatigue, despite using a CPAP machine for his sleep apnea. The patient has been considering joining a gym but has been deterred by his fatigue. He expresses a desire for increased energy and stamina.      Chief Complaint Reviewed and Verified  Nursing Notes Reviewed and   Verified  Tobacco Reviewed  Allergies Reviewed  Medications Reviewed    Problem List Reviewed         Tobacco:  He has never smoked tobacco.    Current Medications[2]           Review of Systems:  See above      Objective:   /74   Pulse 85   Wt 285 lb (129.3 kg)   SpO2 96%   BMI 40.89 kg/m²    Estimated body mass index is 40.89 kg/m² as calculated from the following:    Height as of 4/15/25: 5' 10\" (1.778 m).    Weight as of this encounter: 285 lb (129.3 kg).  Results  RADIOLOGY  No results found.       Physical Exam  Physical Exam  GENERAL: No acute distress, well developed, well nourished.  CHEST: Lungs clear to auscultation bilaterally.  CARDIOVASCULAR: Regular rate and rhythm, normal heart sounds.  EXTREMITIES: 1+ nonpitting edema in ankles.      Assessment & Plan:   1. Morbid obesity with BMI of 40.0-44.9, adult (HCC) (Primary)  2. Sleep apnea, unspecified type  Overview:  12/2023-started CPAP, followed by pulmonary  3. Hepatic steatosis  Overview:  2020 Dr Chao-moderate to severe fibrosis.  HCC screening every 6 months.  Other orders  -     Zepbound; Inject 2.5 mg into the skin once a week.  Dispense: 2 mL; Refill: 0      Assessment & Plan  Assessment & Plan  Severe obesity  Managing with Contrave. Zepbound considered for future use due to cost concerns. Emphasized protein intake and muscle mass maintenance.  - Continue Contrave.  - Consider Zepbound if affordable.  - Emphasize 70g protein intake daily.  - Encourage exercise.  - Consider virtual nutrition class for GLP medications.    Sleep apnea  Uses  CPAP. Reports dry mouth possibly from CPAP. Weight loss may improve symptoms.  - Continue CPAP use.    Constipation  Uses Miralax and milk of magnesia. Miralax ineffective, milk of magnesia effective but causes urgency.  - Continue Miralax and milk of magnesia as needed.  - Monitor effectiveness and adjust as necessary.            Return in about 2 months (around 6/16/2025) for medication, call in 4 wk for new dose.      Lima Rodriguez MD              [1]   Current Outpatient Medications   Medication Sig Dispense Refill    Tirzepatide-Weight Management (ZEPBOUND) 2.5 MG/0.5ML Subcutaneous Solution Inject 2.5 mg into the skin once a week. 2 mL 0    simvastatin 40 MG Oral Tab Take 1 tablet (40 mg total) by mouth nightly. TAKE ONE TABLET BY MOUTH NIGHTLY 90 tablet 3    magnesium hydroxide (MILK OF MAGNESIA) 400 MG/5ML Oral Suspension 1-2 tbsp by mouth as needed      tamsulosin 0.4 MG Oral Cap Take 1 capsule (0.4 mg total) by mouth daily. 90 capsule 3    Cholecalciferol (VITAMIN D) 50 MCG (2000 UT) Oral Cap Take by mouth.      Zinc Sulfate (ZINC 15 OR) Take by mouth.      Coenzyme Q10 (COQ10) 100 MG Oral Cap Take by mouth.      omeprazole (PRILOSEC) 20 MG Oral Capsule Delayed Release Take 1 capsule by mouth every morning. 90 capsule 3    omega-3 fatty acids (FISH OIL) 1000 MG Oral Cap Take  by mouth.     [2]   Current Outpatient Medications   Medication Sig Dispense Refill    Tirzepatide-Weight Management (ZEPBOUND) 2.5 MG/0.5ML Subcutaneous Solution Inject 2.5 mg into the skin once a week. 2 mL 0    simvastatin 40 MG Oral Tab Take 1 tablet (40 mg total) by mouth nightly. TAKE ONE TABLET BY MOUTH NIGHTLY 90 tablet 3    magnesium hydroxide (MILK OF MAGNESIA) 400 MG/5ML Oral Suspension 1-2 tbsp by mouth as needed      tamsulosin 0.4 MG Oral Cap Take 1 capsule (0.4 mg total) by mouth daily. 90 capsule 3    Cholecalciferol (VITAMIN D) 50 MCG (2000 UT) Oral Cap Take by mouth.      Zinc Sulfate (ZINC 15 OR) Take by mouth.       Coenzyme Q10 (COQ10) 100 MG Oral Cap Take by mouth.      omeprazole (PRILOSEC) 20 MG Oral Capsule Delayed Release Take 1 capsule by mouth every morning. 90 capsule 3    omega-3 fatty acids (FISH OIL) 1000 MG Oral Cap Take  by mouth.

## 2025-05-05 ENCOUNTER — TELEPHONE (OUTPATIENT)
Dept: FAMILY MEDICINE CLINIC | Facility: CLINIC | Age: 60
End: 2025-05-05

## 2025-05-06 ENCOUNTER — PATIENT MESSAGE (OUTPATIENT)
Dept: FAMILY MEDICINE CLINIC | Facility: CLINIC | Age: 60
End: 2025-05-06

## 2025-05-06 DIAGNOSIS — E66.01 MORBID OBESITY WITH BMI OF 40.0-44.9, ADULT (HCC): Primary | ICD-10-CM

## 2025-05-06 DIAGNOSIS — K76.0 HEPATIC STEATOSIS: ICD-10-CM

## 2025-05-06 DIAGNOSIS — G47.30 SLEEP APNEA, UNSPECIFIED TYPE: ICD-10-CM

## 2025-05-06 RX ORDER — TIRZEPATIDE 5 MG/.5ML
5 INJECTION, SOLUTION SUBCUTANEOUS WEEKLY
Qty: 2 ML | Refills: 0 | Status: SHIPPED | OUTPATIENT
Start: 2025-05-06 | End: 2025-06-06

## 2025-05-07 RX ORDER — TIRZEPATIDE 2.5 MG/.5ML
2.5 INJECTION, SOLUTION SUBCUTANEOUS WEEKLY
Qty: 0.5 ML | Refills: 0 | Status: SHIPPED | OUTPATIENT
Start: 2025-05-07

## 2025-05-14 ENCOUNTER — PATIENT MESSAGE (OUTPATIENT)
Dept: FAMILY MEDICINE CLINIC | Facility: CLINIC | Age: 60
End: 2025-05-14

## 2025-05-21 RX ORDER — NALTREXONE HYDROCHLORIDE AND BUPROPION HYDROCHLORIDE 8; 90 MG/1; MG/1
2 TABLET, EXTENDED RELEASE ORAL 2 TIMES DAILY
Qty: 360 TABLET | Refills: 0 | OUTPATIENT
Start: 2025-05-21 | End: 2025-08-19

## 2025-06-27 RX ORDER — TIRZEPATIDE 5 MG/.5ML
INJECTION, SOLUTION SUBCUTANEOUS
Qty: 2 ML | Refills: 0 | OUTPATIENT
Start: 2025-06-27

## 2025-06-27 RX ORDER — TIRZEPATIDE 7.5 MG/.5ML
7.5 INJECTION, SOLUTION SUBCUTANEOUS WEEKLY
Qty: 2 ML | Refills: 0 | Status: SHIPPED | OUTPATIENT
Start: 2025-06-27 | End: 2025-07-19

## 2025-06-27 NOTE — TELEPHONE ENCOUNTER
For replies, please route to pool: [facility] CENTRAL REFILLS    Please review: medication fails/has no protocol attached.    Future Appointments   Date Time Provider Department Center   8/13/2025 10:00 AM Lima Rodriguez MD [facility]     Zepbound 7.5 mg vials pended for your review and approval, if agreeable    Which GLP is the patient on: Zepbound  Current dose: 5 mg  Patient seeking refill or increase to next dose: Increase  How many doses of current dose completed: 4  Side effects, if any: Per patient - not that he is aware of  Dose patient weigh themselves at home?: Yes - per patient weighs once weekly  Current weight / how much weight loss: 270 lbs. / patient has lost 14 lbs - since starting Zepbound (58 lbs total since starting first with Michael/Ema Maldonado and now Zepbound)  Last visit: 4/16/25

## 2025-07-14 ENCOUNTER — PATIENT MESSAGE (OUTPATIENT)
Dept: FAMILY MEDICINE CLINIC | Facility: CLINIC | Age: 60
End: 2025-07-14

## 2025-07-14 DIAGNOSIS — M17.0 PRIMARY OSTEOARTHRITIS OF BOTH KNEES: Primary | ICD-10-CM

## 2025-07-23 RX ORDER — TAMSULOSIN HYDROCHLORIDE 0.4 MG/1
0.4 CAPSULE ORAL DAILY
Qty: 90 CAPSULE | Refills: 3 | Status: SHIPPED | OUTPATIENT
Start: 2025-07-23 | End: 2026-07-23

## 2025-07-24 NOTE — TELEPHONE ENCOUNTER
Refill passed per [facility] protocol.      Requested Prescriptions   Pending Prescriptions Disp Refills    tamsulosin 0.4 MG Oral Cap 90 capsule 3     Sig: Take 1 capsule (0.4 mg total) by mouth daily.       Genitourinary Medications Passed - 7/23/2025  8:47 PM        Passed - Patient does not have pulmonary hypertension on problem list        Passed - In person appointment or virtual visit in the past 12 mos or appointment in next 3 mos     Recent Outpatient Visits              3 months ago Morbid obesity with BMI of 40.0-44.9, adult ([facility])    [facility] Lima Rodriguez MD    Office Visit    3 months ago NGOC (obstructive sleep apnea)    [facility] Zion Bunn PA-C    Office Visit    6 months ago Severe obesity (BMI 35.0-39.9) with comorbidity ([facility])    [facility] [place]    Office Visit    8 months ago Routine physical examination    [facility] Lima Rodriguez MD    Office Visit    11 months ago Morbid obesity with BMI of 40.0-44.9, adult ([facility])    [facility] [place]    Office Visit          Future Appointments         Provider Department Appt Notes    In 3 weeks Lima Rodriguez MD [facility] 2 month f/u    In 8 months Zion Bunn PA-C [facility] 1 year check up                    Passed - Medication is active on med list              Future Appointments         Provider Department Appt Notes    In 3 weeks Lima Rodriguez MD [facility] 2 month f/u    In 8 months Zion Bunn PA-C [facility] 1 year check up            Recent Outpatient Visits              3 months ago Morbid obesity with  BMI of 40.0-44.9, adult ([facility])    [facility] Lima Rodriguez MD    Office Visit    3 months ago NGOC (obstructive sleep apnea)    [facility] [place] Zion Bunn PA-C    Office Visit    6 months ago Severe obesity (BMI 35.0-39.9) with comorbidity ([facility])    [facility] Lima Khan MD    Office Visit    8 months ago Routine physical examination    [facility] Lima Khan MD    Office Visit    11 months ago Morbid obesity with BMI of 40.0-44.9, adult ([facility])    [facility] Lima Khan MD    Office Visit

## 2025-08-13 ENCOUNTER — NURSE TRIAGE (OUTPATIENT)
Dept: FAMILY MEDICINE CLINIC | Facility: CLINIC | Age: 60
End: 2025-08-13

## 2025-08-14 ENCOUNTER — OFFICE VISIT (OUTPATIENT)
Dept: FAMILY MEDICINE CLINIC | Facility: CLINIC | Age: 60
End: 2025-08-14

## 2025-08-14 VITALS
HEART RATE: 78 BPM | WEIGHT: 276 LBS | BODY MASS INDEX: 40 KG/M2 | SYSTOLIC BLOOD PRESSURE: 113 MMHG | OXYGEN SATURATION: 96 % | DIASTOLIC BLOOD PRESSURE: 74 MMHG

## 2025-08-14 DIAGNOSIS — E66.01 MORBID OBESITY WITH BMI OF 40.0-44.9, ADULT (HCC): Primary | ICD-10-CM

## 2025-08-14 DIAGNOSIS — E66.01 MORBID OBESITY WITH BMI OF 40.0-44.9, ADULT (HCC): ICD-10-CM

## 2025-08-14 DIAGNOSIS — M17.0 PRIMARY OSTEOARTHRITIS OF BOTH KNEES: ICD-10-CM

## 2025-08-14 DIAGNOSIS — K52.9 GASTROENTERITIS: ICD-10-CM

## 2025-08-14 DIAGNOSIS — E78.00 HYPERCHOLESTEROLEMIA: ICD-10-CM

## 2025-08-14 DIAGNOSIS — G47.30 SLEEP APNEA, UNSPECIFIED TYPE: ICD-10-CM

## 2025-08-14 PROCEDURE — 99213 OFFICE O/P EST LOW 20 MIN: CPT | Performed by: FAMILY MEDICINE

## 2025-08-14 RX ORDER — TIRZEPATIDE 12.5 MG/.5ML
12.5 INJECTION, SOLUTION SUBCUTANEOUS WEEKLY
Qty: 2 ML | Refills: 0 | Status: SHIPPED | OUTPATIENT
Start: 2025-08-14 | End: 2025-08-15

## 2025-08-15 ENCOUNTER — PATIENT MESSAGE (OUTPATIENT)
Dept: FAMILY MEDICINE CLINIC | Facility: CLINIC | Age: 60
End: 2025-08-15

## 2025-08-15 RX ORDER — TIRZEPATIDE 12.5 MG/.5ML
12.5 INJECTION, SOLUTION SUBCUTANEOUS WEEKLY
Qty: 2 ML | Refills: 0 | Status: SHIPPED | OUTPATIENT
Start: 2025-08-15

## 2025-08-18 RX ORDER — TIRZEPATIDE 10 MG/.5ML
INJECTION, SOLUTION SUBCUTANEOUS
Qty: 2 ML | Refills: 0 | OUTPATIENT
Start: 2025-08-18

## (undated) DIAGNOSIS — R73.9 HYPERGLYCEMIA: Primary | ICD-10-CM

## (undated) DEVICE — Device: Brand: DUAL NARE NASAL CANNULAE FEMALE LUER CON 7FT O2 TUBE

## (undated) DEVICE — YANKAUER SUCTION INSTRUMENT NO CONTROL VENT, BULB TIP, CLEAR: Brand: YANKAUER

## (undated) DEVICE — MEDI-VAC NON-CONDUCTIVE SUCTION TUBING: Brand: CARDINAL HEALTH

## (undated) DEVICE — 60 ML SYRINGE REGULAR TIP: Brand: MONOJECT

## (undated) DEVICE — KIT ENDO ORCAPOD 160/180/190

## (undated) DEVICE — FORCEPS BX L240CM DIA2.4MM L NDL RAD JAW 4

## (undated) DEVICE — CONMED SCOPE SAVER BITE BLOCK, 20X27 MM: Brand: SCOPE SAVER

## (undated) DEVICE — KIT CLEAN ENDOKIT 1.1OZ GOWNX2

## (undated) DEVICE — MEDI-VAC NON-CONDUCTIVE SUCTION TUBING 6MM X 1.8M (6FT.) L: Brand: CARDINAL HEALTH

## (undated) NOTE — LETTER
AUTHORIZATION FOR SURGICAL OPERATION OR OTHER PROCEDURE    1.  I hereby authorize Dr. Blanca Carrasquillo , and [facility] staff assigned to my case to perform the following operation and/or procedure at the [facility]:    ___________________________ Patient Name:  ______________________________________________________  (please print)      Patient signature:  ___________________________________________________             Relationship to Patient:           []  Parent    Responsible person

## (undated) NOTE — MR AVS SNAPSHOT
[facility]  [address], [address] [place] [address]  [phone]               Thank you for choosing us for your health care visit with Eduardo Anderson.  Rikki Thompson MD.  We are glad to serve you and happy to provide you with this summary Where to Get Your Medications      These medications were sent to [address] [place] [address]  [phone], [address] [place] [address],[address], [address]     Phone:  [phone]    - simvastatin 40 MG Tabs      You can get these HOW TO GET STARTED: HOW TO STAY MOTIVATED:   Start activities slowly and build up over time Do what you like   Get your heart pumping – brisk walking, biking, swimming Even 10 minute increments are effective and add up over the week   2 ½ hours per week –

## (undated) NOTE — LETTER
[address]  Authorization for Surgical Operation and Procedure                                                                                           I hereby authorize Yue Wells MD, my physician and his/her assistants (if applicable), which may include medical students, residents, and/or fellows, to perform the following surgical operation/ procedure and administer such anesthesia as may be determined necessary by my physician: Operation/Procedure name (s) COLONOSCOPY / ESOPHAGOGASTRODUODENOSCOPY on Leonardo Overton   2.   I recognize that during the surgical operation/procedure, unforeseen conditions may necessitate additional or different procedures than those listed above. I, therefore, further authorize and request that the above-named surgeon, assistants, or designees perform such procedures as are, in their judgment, necessary and desirable. 3.   My surgeon/physician has discussed prior to my surgery the potential benefits, risks and side effects of this procedure; the likelihood of achieving goals; and potential problems that might occur during recuperation. They also discussed reasonable alternatives to the procedure, including risks, benefits, and side effects related to the alternatives and risks related to not receiving this procedure. I have had all my questions answered and I acknowledge that no guarantee has been made as to the result that may be obtained. 4.   Should the need arise during my operation/procedure, which includes change of level of care prior to discharge, I also consent to the administration of blood and/or blood products. Further, I understand that despite careful testing and screening of blood or blood products by collecting agencies, I may still be subject to ill effects as a result of receiving a blood transfusion and/or blood products.   The following are some, but not all, of the potential risks that can occur: fever and allergic reactions, hemolytic reactions, transmission of diseases such as Hepatitis, AIDS and Cytomegalovirus (CMV) and fluid overload. In the event that I wish to have an autologous transfusion of my own blood, or a directed donor transfusion, I will discuss this with my physician. Check only if Refusing Blood or Blood Products  I understand refusal of blood or blood products as deemed necessary by my physician may have serious consequences to my condition to include possible death. I hereby assume responsibility for my refusal and release the hospital, its personnel, and my physicians from any responsibility for the consequences of my refusal.    o  Refuse   5. I authorize the use of any specimen, organs, tissues, body parts or foreign objects that may be removed from my body during the operation/procedure for diagnosis, research or teaching purposes and their subsequent disposal by hospital authorities. I also authorize the release of specimen test results and/or written reports to my treating physician on the hospital medical staff or other referring or consulting physicians involved in my care, at the discretion of the Pathologist or my treating physician. 6.   I consent to the photographing or videotaping of the operations or procedures to be performed, including appropriate portions of my body for medical, scientific, or educational purposes, provided my identity is not revealed by the pictures or by descriptive texts accompanying them. If the procedure has been photographed/videotaped, the surgeon will obtain the original picture, image, videotape or CD. The hospital will not be responsible for storage, release or maintenance of the picture, image, tape or CD.    7.   I consent to the presence of a  or observers in the operating room as deemed necessary by my physician or their designees.     8.   I recognize that in the event my procedure results in extended X-Ray/fluoroscopy time, I may develop a skin reaction. 9. If I have a Do Not Attempt Resuscitation (DNAR) order in place, that status will be suspended while in the operating room, procedural suite, and during the recovery period unless otherwise explicitly stated by me (or a person authorized to consent on my behalf). The surgeon or my attending physician will determine when the applicable recovery period ends for purposes of reinstating the DNAR order. 10. Patients having a sterilization procedure: I understand that if the procedure is successful the results will be permanent and it will therefore be impossible for me to inseminate, conceive, or bear children. I also understand that the procedure is intended to result in sterility, although the result has not been guaranteed. 11. I acknowledge that my physician has explained sedation/analgesia administration to me including the risk and benefits I consent to the administration of sedation/analgesia as may be necessary or desirable in the judgment of my physician. I CERTIFY THAT I HAVE READ AND FULLY UNDERSTAND THE ABOVE CONSENT TO OPERATION and/or OTHER PROCEDURE.     _________________________________________ _________________________________     ___________________________________  Signature of Patient     Signature of Responsible Person                   Printed Name of Responsible Person                              _________________________________________ ______________________________        ___________________________________  Signature of Witness         Date  Time         Relationship to Patient    STATEMENT OF PHYSICIAN My signature below affirms that prior to the time of the procedure; I have explained to the patient and/or his/her legal representative, the risks and benefits involved in the proposed treatment and any reasonable alternative to the proposed treatment.  I have also explained the risks and benefits involved in refusal of the proposed treatment and alternatives to the proposed treatment and have answered the patient's questions.  If I have a significant financial interest in a co-management agreement or a significant financial interest in any product or implant, or other significant relationship used in this procedure/surgery, I have disclosed this and had a discussion with my patient.     _______________________________________________________________ _____________________________  Jamison Faye of Physician)                                                                                         (Date)                                   (Time)  Patient Name: Taryn Han    : 1/10/1965   Printed: 10/25/2023      Medical Record #: [id number]                                              Page 1 of 1

## (undated) NOTE — Clinical Note
Mary,  I saw Mallika Best in clinic today for weight loss/management. I have recommended intensive lifestyle/behavioral modifications for weight loss. In addition, I have recommended medication to help support his efforts.  He mentioned some interest in lenora

## (undated) NOTE — LETTER
AUTHORIZATION FOR SURGICAL OPERATION OR OTHER PROCEDURE    1. I hereby authorize Dr. Addy Dempsey, and [place] [employer] [facility] staff assigned to my case to perform the following operation and/or procedure at the [place] [employer] [place]:                                                                        Right knee Cortisone injection  _______________________________________________________________________________________________      _______________________________________________________________________________________________    2. My physician has explained the nature and purpose of the operation or other procedure, possible alternative methods of treatment, the risks involved, and the possibility of complication to me. I acknowledge that no guarantee has been made as to the result that may be obtained. 3.  I recognize that, during the course of this operation, or other procedure, unforseen conditions may necessitate additional or different procedure than those listed above. I, therefore, further authorize and request that the above named physician, his/her physician assistants or designees perform such procedures as are, in his/her professional opinion, necessary and desirable. 4.  Any tissue or organs removed in the operation or other procedure may be disposed of by and at the discretion of the [place] [employer] [facility] and [address]. 5.  I understand that in the event of a medical emergency, I will be transported by local paramedics to [facility] or other \[facility]\"" emergency department. 6.  I certify that I have read and fully understand the above consent to operation and/or other procedure. 7.  I acknowledge that my physician has explained sedation/analgesia administration to me including the risks and benefits. I consent to the administration of sedation/analgesia as may be necessary or desirable in the judgement of my physician.     Witness signature: ___________________________________________________ Date:  ______/______/_____                    Time:  ________ A. M.  P.M. Patient Name:  ______________________________________________________  (please print)      Patient signature:  ___________________________________________________             Relationship to Patient:           []  Parent    Responsible person                          []  Spouse  In case of minor or                    [] Other  _____________   Incompetent name:  __________________________________________________                               (please print)      _____________      Responsible person  In case of minor or  Incompetent signature:  _______________________________________________    Statement of Physician  My signature below affirms that prior to the time of the procedure, I have explained to the patient and/or his/her guardian, the risks and benefits involved in the proposed treatment and any reasonable alternative to the proposed treatment. I have also explained the risks and benefits involved in the refusal of the proposed treatment and have answered the patient's questions.                         Date:  ______/______/_______  Provider                      Signature:  __________________________________________________________       Time:  ___________ ARNOLDO RAMOS

## (undated) NOTE — LETTER
AUTHORIZATION FOR SURGICAL OPERATION OR OTHER PROCEDURE    1.  I hereby authorize Dr. Karlee Hayes  and [address] [place] [address] staff assigned to my case to perform the following operation and/or procedure at the [address] [place] [address]:    Cortisone injection in bi Time:  ________ A. M.  P.M.        Patient Name:  ______________________________________________________  (please print)      Patient signature:  ___________________________________________________             Relationship to Patient:

## (undated) NOTE — LETTER
AUTHORIZATION FOR SURGICAL OPERATION OR OTHER PROCEDURE    1.  I hereby authorize Dr. Jefry Riley , and [address] [place] [address] staff assigned to my case to perform the following operation and/or procedure at the [address] [place] [address]:    ___________________________ Patient Name:  ______________________________________________________  (please print)      Patient signature:  ___________________________________________________             Relationship to Patient:           []  Parent    Responsible person

## (undated) NOTE — LETTER
AUTHORIZATION FOR SURGICAL OPERATION OR OTHER PROCEDURE    1. I hereby authorize Dr Lemuel Harrison  , and [facility] staff assigned to my case to perform the following operation and/or procedure at the [facility]:    _______________________________ Patient Name:  ______________________________________________________  (please print)      Patient signature:  ___________________________________________________             Relationship to Patient:           []  Parent    Responsible person

## (undated) NOTE — LETTER
AUTHORIZATION FOR SURGICAL OPERATION OR OTHER PROCEDURE    1.  I hereby authorize Dr. Maria Esther Mendez , and [facility] to my case to perform the following operation and/or procedure at the [facility]:    ___________________________ Patient Name:  ______________________________________________________  (please print)      Patient signature:  ___________________________________________________             Relationship to Patient:           []  Parent    Responsible person